# Patient Record
Sex: FEMALE | Race: WHITE | NOT HISPANIC OR LATINO | Employment: FULL TIME | URBAN - METROPOLITAN AREA
[De-identification: names, ages, dates, MRNs, and addresses within clinical notes are randomized per-mention and may not be internally consistent; named-entity substitution may affect disease eponyms.]

---

## 2019-09-05 ENCOUNTER — TELEPHONE (OUTPATIENT)
Dept: FAMILY MEDICINE | Facility: CLINIC | Age: 44
End: 2019-09-05

## 2019-09-10 ENCOUNTER — TELEPHONE (OUTPATIENT)
Dept: FAMILY MEDICINE | Facility: CLINIC | Age: 44
End: 2019-09-10

## 2019-09-10 DIAGNOSIS — D22.9 ATYPICAL MOLE: Primary | ICD-10-CM

## 2019-09-10 NOTE — TELEPHONE ENCOUNTER
Pt has moles, asking for you to ref her to a derm. Please put in ref for Ashely to assit with sched

## 2019-09-10 NOTE — TELEPHONE ENCOUNTER
----- Message from Matilde Ayala sent at 9/10/2019  3:57 PM CDT -----  Contact: [phone] self   Pt is requesting a referral for a Derm. Please advise

## 2019-10-02 ENCOUNTER — INITIAL CONSULT (OUTPATIENT)
Dept: DERMATOLOGY | Facility: CLINIC | Age: 44
End: 2019-10-02
Payer: COMMERCIAL

## 2019-10-02 VITALS — BODY MASS INDEX: 32.51 KG/M2 | HEIGHT: 72 IN | WEIGHT: 240 LBS

## 2019-10-02 DIAGNOSIS — L91.8 SKIN TAG: ICD-10-CM

## 2019-10-02 DIAGNOSIS — Z87.898 HISTORY OF ATYPICAL NEVUS: Primary | ICD-10-CM

## 2019-10-02 DIAGNOSIS — L30.4 INTERTRIGO: ICD-10-CM

## 2019-10-02 DIAGNOSIS — L50.9 URTICARIA: ICD-10-CM

## 2019-10-02 DIAGNOSIS — Z12.83 SKIN CANCER SCREENING: ICD-10-CM

## 2019-10-02 PROCEDURE — 3008F PR BODY MASS INDEX (BMI) DOCUMENTED: ICD-10-PCS | Mod: CPTII,S$GLB,, | Performed by: DERMATOLOGY

## 2019-10-02 PROCEDURE — 99203 OFFICE O/P NEW LOW 30 MIN: CPT | Mod: S$GLB,,, | Performed by: DERMATOLOGY

## 2019-10-02 PROCEDURE — 99999 PR PBB SHADOW E&M-EST. PATIENT-LVL II: CPT | Mod: PBBFAC,,, | Performed by: DERMATOLOGY

## 2019-10-02 PROCEDURE — 99999 PR PBB SHADOW E&M-EST. PATIENT-LVL II: ICD-10-PCS | Mod: PBBFAC,,, | Performed by: DERMATOLOGY

## 2019-10-02 PROCEDURE — 99203 PR OFFICE/OUTPT VISIT, NEW, LEVL III, 30-44 MIN: ICD-10-PCS | Mod: S$GLB,,, | Performed by: DERMATOLOGY

## 2019-10-02 PROCEDURE — 3008F BODY MASS INDEX DOCD: CPT | Mod: CPTII,S$GLB,, | Performed by: DERMATOLOGY

## 2019-10-02 NOTE — LETTER
October 2, 2019      Wai Rodriguez MD  [id number] [facility]  [address]  [facility] [address]           [address], [address]  [facility] [address]  Phone: [phone]          Patient: Matilde Redding   MR Number: [id number]   YOB: 1975   Date of Visit: 10/2/2019       Dear Dr. Wai Rodriguez:    Thank you for referring Matilde Redding to me for evaluation. Attached you will find relevant portions of my assessment and plan of care.    If you have questions, please do not hesitate to call me. I look forward to following Matilde Redding along with you.    Sincerely,    Rosalinda Elias MD    Enclosure  CC:  No Recipients    If you would like to receive this communication electronically, please contact [email or web address] or [phone] to request more information on [employer] Link access.    For providers and/or their staff who would like to refer a patient to [facility], please contact us through our one-stop-shop provider referral line, [facility], at [phone].    If you feel you have received this communication in error or would no longer like to receive these types of communications, please e-mail [email or web address]

## 2019-10-02 NOTE — PROGRESS NOTES
"  Subjective:       Patient ID:  Matilde Redding is a 44 y.o. female who presents for   Chief Complaint   Patient presents with    Spot     chest, bothersome, x several years, no tx    Skin Check     TBSE     New patient   Was previously seen by a dermatologist and had a mole removed and was told it was "pre-melanoma" at age 18. She has a couple concerning spots. She is concerned about a spot on her chest that has become bothersome and has been present for several years. She is not treating. She also desires a TBSE.     Former tanning bed exposure.   Denies family history atypical nevi or melanoma    C/o rash inframammary, years, worse with heat, using powder, irritating.   Hives when gots hot- unloading truck or at beach. No sob. Benadryl helps.     Review of Systems   Constitutional: Negative for fever, chills and fatigue.   Skin: Negative for daily sunscreen use, activity-related sunscreen use and wears hat.   Hematologic/Lymphatic: Does not bruise/bleed easily.        Objective:    Physical Exam   Constitutional: She appears well-developed and well-nourished. No distress.   HENT:   Mouth/Throat: Lips normal.    Eyes: Lids are normal.  No conjunctival no injection.   Cardiovascular: There is no local extremity swelling and no dependent edema.     Neurological: She is alert and oriented to person, place, and time. She is not disoriented.   Psychiatric: She has a normal mood and affect.   Skin:   Areas Examined (abnormalities noted in diagram):   Scalp / Hair Palpated and Inspected  Head / Face Inspection Performed  Neck Inspection Performed  Chest / Axilla Inspection Performed  Abdomen Inspection Performed  Genitals / Buttocks / Groin Inspection Performed  Back Inspection Performed  RUE Inspected  LUE Inspection Performed  RLE Inspected  LLE Inspection Performed  Nails and Digits Inspection Performed                   Diagram Legend     Erythematous scaling macule/papule c/w actinic keratosis       Vascular " papule c/w angioma      Pigmented verrucoid papule/plaque c/w seborrheic keratosis      Yellow umbilicated papule c/w sebaceous hyperplasia      Irregularly shaped tan macule c/w lentigo     1-2 mm smooth white papules consistent with Milia      Movable subcutaneous cyst with punctum c/w epidermal inclusion cyst      Subcutaneous movable cyst c/w pilar cyst      Firm pink to brown papule c/w dermatofibroma      Pedunculated fleshy papule(s) c/w skin tag(s)      Evenly pigmented macule c/w junctional nevus     Mildly variegated pigmented, slightly irregular-bordered macule c/w mildly atypical nevus      Flesh colored to evenly pigmented papule c/w intradermal nevus       Pink pearly papule/plaque c/w basal cell carcinoma      Erythematous hyperkeratotic cursted plaque c/w SCC      Surgical scar with no sign of skin cancer recurrence      Open and closed comedones      Inflammatory papules and pustules      Verrucoid papule consistent consistent with wart     Erythematous eczematous patches and plaques     Dystrophic onycholytic nail with subungual debris c/w onychomycosis     Umbilicated papule    Erythematous-base heme-crusted tan verrucoid plaque consistent with inflamed seborrheic keratosis     Erythematous Silvery Scaling Plaque c/w Psoriasis     See annotation      Assessment / Plan:        History of atypical nevus  Area of previous atypical nevus examined. Site well healed with no signs of recurrence.    Total body skin examination performed today including at least 12 points as noted in physical examination. No lesions suspicious for malignancy noted.      Skin cancer screening  Area of previous atypical nevus examined. Site well healed with no signs of recurrence.    Total body skin examination performed today including at least 12 points as noted in physical examination. No lesions suspicious for malignancy noted.    Intertrigo, inframammary  cool blow dry and then application of otc desitin cream bid    Once clear, use baby powder, corn starch, or Zeasorb AF powder for maintenance to affected area.      Urticaria  Heat induced  Refer to allergy  xyzal qam for now on days when knows will be overheated    Skin tag, left breast  Reassurance given to patient. No treatment is necessary.   Treatment of benign, asymptomatic lesions may be considered cosmetic.               Follow up in about 1 year (around 10/2/2020).

## 2019-11-05 ENCOUNTER — TELEPHONE (OUTPATIENT)
Dept: FAMILY MEDICINE | Facility: CLINIC | Age: 44
End: 2019-11-05

## 2019-11-05 DIAGNOSIS — Z00.00 ANNUAL PHYSICAL EXAM: Primary | ICD-10-CM

## 2019-11-05 NOTE — TELEPHONE ENCOUNTER
----- Message from Liliya Lugo sent at 11/5/2019 10:26 AM CST -----  Patient came in regarding needing labs put in before her appointment , would like to go to the [facility] in [place] , please call her# [phone] if have any questions. Thanks

## 2019-11-05 NOTE — TELEPHONE ENCOUNTER
Pt called and stated that she would like to have lab orders put in. She has an appt to see Dr. Rodriguez on 12/5. Former EJ pt. Please advise.

## 2019-11-21 ENCOUNTER — TELEPHONE (OUTPATIENT)
Dept: ADMINISTRATIVE | Facility: HOSPITAL | Age: 44
End: 2019-11-21

## 2019-11-21 ENCOUNTER — PATIENT OUTREACH (OUTPATIENT)
Dept: ADMINISTRATIVE | Facility: HOSPITAL | Age: 44
End: 2019-11-21

## 2019-11-21 LAB — HPV RNA, HR E6/E7, TMA: NEGATIVE

## 2019-11-25 ENCOUNTER — LAB VISIT (OUTPATIENT)
Dept: LAB | Facility: HOSPITAL | Age: 44
End: 2019-11-25
Attending: INTERNAL MEDICINE
Payer: COMMERCIAL

## 2019-11-25 DIAGNOSIS — Z00.00 ANNUAL PHYSICAL EXAM: ICD-10-CM

## 2019-11-25 LAB
ALBUMIN SERPL BCP-MCNC: 3.2 G/DL (ref 3.5–5.2)
ALP SERPL-CCNC: 95 U/L (ref 55–135)
ALT SERPL W/O P-5'-P-CCNC: 15 U/L (ref 10–44)
ANION GAP SERPL CALC-SCNC: 7 MMOL/L (ref 8–16)
AST SERPL-CCNC: 14 U/L (ref 10–40)
BASOPHILS # BLD AUTO: 0.04 K/UL (ref 0–0.2)
BASOPHILS NFR BLD: 0.5 % (ref 0–1.9)
BILIRUB SERPL-MCNC: 0.7 MG/DL (ref 0.1–1)
BUN SERPL-MCNC: 9 MG/DL (ref 6–20)
CALCIUM SERPL-MCNC: 8.7 MG/DL (ref 8.7–10.5)
CHLORIDE SERPL-SCNC: 106 MMOL/L (ref 95–110)
CHOLEST SERPL-MCNC: 215 MG/DL (ref 120–199)
CHOLEST/HDLC SERPL: 3.8 {RATIO} (ref 2–5)
CO2 SERPL-SCNC: 28 MMOL/L (ref 23–29)
CREAT SERPL-MCNC: 0.8 MG/DL (ref 0.5–1.4)
DIFFERENTIAL METHOD: ABNORMAL
EOSINOPHIL # BLD AUTO: 0.3 K/UL (ref 0–0.5)
EOSINOPHIL NFR BLD: 3.5 % (ref 0–8)
ERYTHROCYTE [DISTWIDTH] IN BLOOD BY AUTOMATED COUNT: 13.6 % (ref 11.5–14.5)
EST. GFR  (AFRICAN AMERICAN): >60 ML/MIN/1.73 M^2
EST. GFR  (NON AFRICAN AMERICAN): >60 ML/MIN/1.73 M^2
GLUCOSE SERPL-MCNC: 93 MG/DL (ref 70–110)
HCT VFR BLD AUTO: 45 % (ref 37–48.5)
HDLC SERPL-MCNC: 57 MG/DL (ref 40–75)
HDLC SERPL: 26.5 % (ref 20–50)
HGB BLD-MCNC: 14.2 G/DL (ref 12–16)
IMM GRANULOCYTES # BLD AUTO: 0.01 K/UL (ref 0–0.04)
IMM GRANULOCYTES NFR BLD AUTO: 0.1 % (ref 0–0.5)
LDLC SERPL CALC-MCNC: 144.4 MG/DL (ref 63–159)
LYMPHOCYTES # BLD AUTO: 2.8 K/UL (ref 1–4.8)
LYMPHOCYTES NFR BLD: 36.1 % (ref 18–48)
MCH RBC QN AUTO: 28.1 PG (ref 27–31)
MCHC RBC AUTO-ENTMCNC: 31.6 G/DL (ref 32–36)
MCV RBC AUTO: 89 FL (ref 82–98)
MONOCYTES # BLD AUTO: 0.4 K/UL (ref 0.3–1)
MONOCYTES NFR BLD: 5.7 % (ref 4–15)
NEUTROPHILS # BLD AUTO: 4.2 K/UL (ref 1.8–7.7)
NEUTROPHILS NFR BLD: 54.1 % (ref 38–73)
NONHDLC SERPL-MCNC: 158 MG/DL
NRBC BLD-RTO: 0 /100 WBC
PLATELET # BLD AUTO: 401 K/UL (ref 150–350)
PMV BLD AUTO: 9.8 FL (ref 9.2–12.9)
POTASSIUM SERPL-SCNC: 4.4 MMOL/L (ref 3.5–5.1)
PROT SERPL-MCNC: 7.2 G/DL (ref 6–8.4)
RBC # BLD AUTO: 5.05 M/UL (ref 4–5.4)
SODIUM SERPL-SCNC: 141 MMOL/L (ref 136–145)
TRIGL SERPL-MCNC: 68 MG/DL (ref 30–150)
TSH SERPL DL<=0.005 MIU/L-ACNC: 1.04 UIU/ML (ref 0.4–4)
WBC # BLD AUTO: 7.75 K/UL (ref 3.9–12.7)

## 2019-11-25 PROCEDURE — 80061 LIPID PANEL: CPT

## 2019-11-25 PROCEDURE — 36415 COLL VENOUS BLD VENIPUNCTURE: CPT | Mod: PO

## 2019-11-25 PROCEDURE — 85025 COMPLETE CBC W/AUTO DIFF WBC: CPT

## 2019-11-25 PROCEDURE — 84443 ASSAY THYROID STIM HORMONE: CPT

## 2019-11-25 PROCEDURE — 80053 COMPREHEN METABOLIC PANEL: CPT

## 2019-11-26 ENCOUNTER — TELEPHONE (OUTPATIENT)
Dept: FAMILY MEDICINE | Facility: CLINIC | Age: 44
End: 2019-11-26

## 2019-11-26 NOTE — TELEPHONE ENCOUNTER
----- Message from Hanna Foy sent at 11/26/2019 11:27 AM CST -----  Contact: [phone]/self   Patient states she is returning your call. Please advise.

## 2019-12-05 ENCOUNTER — OFFICE VISIT (OUTPATIENT)
Dept: FAMILY MEDICINE | Facility: CLINIC | Age: 44
End: 2019-12-05
Payer: COMMERCIAL

## 2019-12-05 VITALS
WEIGHT: 258.69 LBS | TEMPERATURE: 98 F | HEART RATE: 72 BPM | DIASTOLIC BLOOD PRESSURE: 86 MMHG | HEIGHT: 72 IN | BODY MASS INDEX: 35.04 KG/M2 | SYSTOLIC BLOOD PRESSURE: 128 MMHG | OXYGEN SATURATION: 98 %

## 2019-12-05 DIAGNOSIS — Z00.00 ANNUAL PHYSICAL EXAM: ICD-10-CM

## 2019-12-05 DIAGNOSIS — Z23 NEED FOR INFLUENZA VACCINATION: Primary | ICD-10-CM

## 2019-12-05 PROCEDURE — 90686 FLU VACCINE (QUAD) GREATER THAN OR EQUAL TO 3YO PRESERVATIVE FREE IM: ICD-10-PCS | Mod: S$GLB,,, | Performed by: INTERNAL MEDICINE

## 2019-12-05 PROCEDURE — 90471 IMMUNIZATION ADMIN: CPT | Mod: S$GLB,,, | Performed by: INTERNAL MEDICINE

## 2019-12-05 PROCEDURE — 99999 PR PBB SHADOW E&M-EST. PATIENT-LVL III: ICD-10-PCS | Mod: PBBFAC,,, | Performed by: INTERNAL MEDICINE

## 2019-12-05 PROCEDURE — 90686 IIV4 VACC NO PRSV 0.5 ML IM: CPT | Mod: S$GLB,,, | Performed by: INTERNAL MEDICINE

## 2019-12-05 PROCEDURE — 99396 PREV VISIT EST AGE 40-64: CPT | Mod: 25,S$GLB,, | Performed by: INTERNAL MEDICINE

## 2019-12-05 PROCEDURE — 99396 PR PREVENTIVE VISIT,EST,40-64: ICD-10-PCS | Mod: 25,S$GLB,, | Performed by: INTERNAL MEDICINE

## 2019-12-05 PROCEDURE — 99999 PR PBB SHADOW E&M-EST. PATIENT-LVL III: CPT | Mod: PBBFAC,,, | Performed by: INTERNAL MEDICINE

## 2019-12-05 PROCEDURE — 90471 FLU VACCINE (QUAD) GREATER THAN OR EQUAL TO 3YO PRESERVATIVE FREE IM: ICD-10-PCS | Mod: S$GLB,,, | Performed by: INTERNAL MEDICINE

## 2019-12-05 RX ORDER — CETIRIZINE HYDROCHLORIDE 10 MG/1
10 TABLET ORAL DAILY
COMMUNITY
End: 2023-11-13 | Stop reason: ALTCHOICE

## 2019-12-05 RX ORDER — OSELTAMIVIR PHOSPHATE 75 MG/1
75 CAPSULE ORAL DAILY
Qty: 10 CAPSULE | Refills: 0 | Status: SHIPPED | OUTPATIENT
Start: 2019-12-05 | End: 2019-12-10

## 2019-12-05 NOTE — PROGRESS NOTES
[facility] Note    Chief Complaint      Chief Complaint   Patient presents with    Annual Exam       History of Present Illness      Matilde Redding is a 44 y.o. female who presents today for   Chief Complaint   Patient presents with    Annual Exam   .  Patient comes to appointment here for annual preventative visit with me . She just had  full labs done all reviewed by me . She is grieving the loss of her father but is feeling and grieving appropriately . Her  was diagnosed with the flu today and she has not had flu vaccine . She has no symptoms currently . She is not as active with exercise and needs to be better donnie diet .       Problem List Items Addressed This Visit        ID    Need for influenza vaccination - Primary       Other    Annual physical exam    Overview     pe documented all labs reviewed with patient today   Stressed diet and exercise   tamiflu prophylaxis flu vaccine given                 Past Medical History:  History reviewed. No pertinent past medical history.    Past Surgical History:  Past Surgical History:   Procedure Laterality Date    PARTIAL HYSTERECTOMY  2017       Family History:  family history includes Cancer in her father; Heart disease in her father; Hypertension in her father and mother.    Social History:  Social History     Socioeconomic History    Marital status:      Spouse name: Not on file    Number of children: Not on file    Years of education: Not on file    Highest education level: Not on file   Occupational History    Not on file   Social Needs    Financial resource strain: Not very hard    Food insecurity:     Worry: Never true     Inability: Never true    Transportation needs:     Medical: No     Non-medical: No   Tobacco Use    Smoking status: Never Smoker    Smokeless tobacco: Never Used   Substance and Sexual Activity    Alcohol use: Not on file    Drug use: Not on file    Sexual activity: Not on file    Lifestyle    Physical activity:     Days per week: 2 days     Minutes per session: 0 min    Stress: To some extent   Relationships    Social connections:     Talks on phone: Three times a week     Gets together: Once a week     Attends [employer] service: Not on file     Active member of club or organization: No     Attends meetings of clubs or organizations: Never     Relationship status:    Other Topics Concern    Are you pregnant or think you may be? Not Asked    Breast-feeding Not Asked   Social History Narrative    Not on file       Review of Systems:   Review of Systems   Constitutional: Negative for chills and fever.   HENT: Negative for hearing loss.    Eyes: Negative for blurred vision and discharge.   Respiratory: Negative for wheezing.    Cardiovascular: Negative for chest pain and palpitations.   Gastrointestinal: Negative for blood in stool, constipation, diarrhea and vomiting.   Genitourinary: Negative for dysuria and hematuria.   Musculoskeletal: Negative for neck pain.   Neurological: Negative for weakness and headaches.   Endo/Heme/Allergies: Negative for polydipsia.   Psychiatric/Behavioral: Negative for depression. The patient is not nervous/anxious and does not have insomnia.          Medications:  Outpatient Encounter Medications as of 12/5/2019   Medication Sig Dispense Refill    cetirizine (ZYRTEC) 10 MG tablet Take 10 mg by mouth once daily.      norethin-e.estradiol triphasic (TRI-NORINYL, 28,) 0.5/1/0.5-35 mg-mcg Tab Take 1 tablet by mouth.      oseltamivir (TAMIFLU) 75 MG capsule Take 1 capsule (75 mg total) by mouth once daily. for 5 days 10 capsule 0     No facility-administered encounter medications on file as of 12/5/2019.         Allergies:  Review of patient's allergies indicates:  No Known Allergies      Physical Exam      Vitals:    12/05/19 0824   BP: 128/86   Pulse: 72   Temp: 97.9 °F (36.6 °C)      Body mass index is 35.09 kg/m².    Physical Exam    Constitutional: She is oriented to person, place, and time. She appears well-developed and well-nourished.   HENT:   Mouth/Throat: Oropharynx is clear and moist.   Eyes: Pupils are equal, round, and reactive to light. EOM are normal.   Neck: Normal range of motion. No thyromegaly present.   Cardiovascular: Normal rate and normal heart sounds. Exam reveals no gallop and no friction rub.   No murmur heard.  Pulmonary/Chest: Breath sounds normal.   Abdominal: Soft. Bowel sounds are normal.   Musculoskeletal: Normal range of motion.   Lymphadenopathy:     She has no cervical adenopathy.   Neurological: She is alert and oriented to person, place, and time. No cranial nerve deficit.   Skin: Skin is warm. No rash noted.   Psychiatric: She has a normal mood and affect. Her behavior is normal.        Laboratory:  CBC:  Recent Labs   Lab Result Units 11/25/19  0846   WBC K/uL 7.75   RBC M/uL 5.05   Hemoglobin g/dL 14.2   Hematocrit % 45.0   Platelets K/uL 401*   Mean Corpuscular Volume fL 89   Mean Corpuscular Hemoglobin pg 28.1   Mean Corpuscular Hemoglobin Conc g/dL 31.6*     CMP:  Recent Labs   Lab Result Units 11/25/19  0846   Glucose mg/dL 93   Calcium mg/dL 8.7   Albumin g/dL 3.2*   Total Protein g/dL 7.2   Sodium mmol/L 141   Potassium mmol/L 4.4   CO2 mmol/L 28   Chloride mmol/L 106   BUN, Bld mg/dL 9   Alkaline Phosphatase U/L 95   ALT U/L 15   AST U/L 14   Total Bilirubin mg/dL 0.7     URINALYSIS:  No results for input(s): COLORU, CLARITYU, SPECGRAV, PHUR, PROTEINUA, GLUCOSEU, BILIRUBINCON, BLOODU, WBCU, RBCU, BACTERIA, MUCUS, NITRITE, LEUKOCYTESUR, UROBILINOGEN, HYALINECASTS in the last 2160 hours.   LIPIDS:  Recent Labs   Lab Result Units 11/25/19  0846   TSH uIU/mL 1.042   HDL mg/dL 57   Cholesterol mg/dL 215*   Triglycerides mg/dL 68   LDL Cholesterol mg/dL 144.4   Hdl/Cholesterol Ratio % 26.5   Non-HDL Cholesterol mg/dL 158   Total Cholesterol/HDL Ratio  3.8     TSH:  Recent Labs   Lab Result Units  11/25/19  0846   TSH uIU/mL 1.042     A1C:  No results for input(s): HGBA1C in the last 2160 hours.    Radiology:        Assessment:     Matilde Redding is a 44 y.o.female with:    Need for influenza vaccination  -     Influenza - Quadrivalent (6 months+) (PF)  -     oseltamivir (TAMIFLU) 75 MG capsule; Take 1 capsule (75 mg total) by mouth once daily. for 5 days  Dispense: 10 capsule; Refill: 0    Annual physical exam          Plan:     Problem List Items Addressed This Visit        ID    Need for influenza vaccination - Primary       Other    Annual physical exam    Overview     pe documented all labs reviewed with patient today   Stressed diet and exercise   tamiflu prophylaxis flu vaccine given               As above, continue current medications and maintain follow up with specialists.  Return to clinic in 12 months.    Frederick W Dantagnan [facility] [place]                Answers for HPI/ROS submitted by the patient on 11/28/2019   activity change: No  unexpected weight change: No  rhinorrhea: No  trouble swallowing: No  visual disturbance: No  chest tightness: No  polyuria: No  difficulty urinating: No  menstrual problem: No  joint swelling: No  arthralgias: No  confusion: No  dysphoric mood: No

## 2020-03-09 PROBLEM — Z00.00 ANNUAL PHYSICAL EXAM: Status: RESOLVED | Noted: 2019-12-05 | Resolved: 2020-03-09

## 2020-05-08 DIAGNOSIS — Z12.39 BREAST CANCER SCREENING: ICD-10-CM

## 2020-06-10 ENCOUNTER — TELEPHONE (OUTPATIENT)
Dept: FAMILY MEDICINE | Facility: CLINIC | Age: 45
End: 2020-06-10

## 2020-06-10 DIAGNOSIS — I10 BENIGN ESSENTIAL HYPERTENSION: Primary | ICD-10-CM

## 2020-06-10 RX ORDER — LISINOPRIL 10 MG/1
10 TABLET ORAL DAILY
Qty: 30 TABLET | Refills: 5 | Status: SHIPPED | OUTPATIENT
Start: 2020-06-10 | End: 2021-03-18

## 2020-06-10 NOTE — TELEPHONE ENCOUNTER
Spoke with patient. Informed patient of Dr Rodriguez's recommendation to initiate Lisinopril and to follow up with him in 2 weeks. Pt verbalizes understanding, and follow up appointment given to supervisor to schedule.

## 2020-06-10 NOTE — TELEPHONE ENCOUNTER
rec we start blood pressure meds lisinopril 10 mg po daily disp 30 refill5 schedule her for bp f/u in 2 week s

## 2020-06-10 NOTE — TELEPHONE ENCOUNTER
----- Message from Ca Albarran sent at 6/10/2020 12:07 PM CDT -----  Contact: self  Type:  Needs Medical Advice    Who Called: self  Reason:Blood pressure is kind of high and was told to get advice from you  Would the patient rather a call back or a response via [facility]? callback  Best Call Back Number: [phone]  Additional Information: none

## 2020-06-10 NOTE — TELEPHONE ENCOUNTER
Spoke with patient. Pt calling in concerned about some elevated BP readings. Pt states that she had to medical visits today and at both visits her BP was elevated, 151/91 and 130/90. Pt states that during her GYN visit, the Dr suggested that she get a recommendation from Dr Rodriguez concerning the BP and the estrogen in her birth control pills. Pt would like to know if Dr Rodriguez recommends keeping the pill with the estrogen or having them changed due to BP elevation.  Informed patient that I would forward this message to Dr Rodriguez and once he responds someone would give her a call back. Pt verbalizes understanding.

## 2020-06-22 ENCOUNTER — OFFICE VISIT (OUTPATIENT)
Dept: FAMILY MEDICINE | Facility: CLINIC | Age: 45
End: 2020-06-22
Payer: COMMERCIAL

## 2020-06-22 VITALS
BODY MASS INDEX: 34.18 KG/M2 | WEIGHT: 252 LBS | SYSTOLIC BLOOD PRESSURE: 134 MMHG | DIASTOLIC BLOOD PRESSURE: 86 MMHG | TEMPERATURE: 97 F | HEART RATE: 88 BPM | OXYGEN SATURATION: 96 %

## 2020-06-22 DIAGNOSIS — I10 BENIGN ESSENTIAL HYPERTENSION: Primary | ICD-10-CM

## 2020-06-22 PROCEDURE — 3008F BODY MASS INDEX DOCD: CPT | Mod: CPTII,S$GLB,, | Performed by: INTERNAL MEDICINE

## 2020-06-22 PROCEDURE — 3079F PR MOST RECENT DIASTOLIC BLOOD PRESSURE 80-89 MM HG: ICD-10-PCS | Mod: CPTII,S$GLB,, | Performed by: INTERNAL MEDICINE

## 2020-06-22 PROCEDURE — 3075F SYST BP GE 130 - 139MM HG: CPT | Mod: CPTII,S$GLB,, | Performed by: INTERNAL MEDICINE

## 2020-06-22 PROCEDURE — 99213 PR OFFICE/OUTPT VISIT, EST, LEVL III, 20-29 MIN: ICD-10-PCS | Mod: S$GLB,,, | Performed by: INTERNAL MEDICINE

## 2020-06-22 PROCEDURE — 99999 PR PBB SHADOW E&M-EST. PATIENT-LVL III: CPT | Mod: PBBFAC,,, | Performed by: INTERNAL MEDICINE

## 2020-06-22 PROCEDURE — 3075F PR MOST RECENT SYSTOLIC BLOOD PRESS GE 130-139MM HG: ICD-10-PCS | Mod: CPTII,S$GLB,, | Performed by: INTERNAL MEDICINE

## 2020-06-22 PROCEDURE — 3008F PR BODY MASS INDEX (BMI) DOCUMENTED: ICD-10-PCS | Mod: CPTII,S$GLB,, | Performed by: INTERNAL MEDICINE

## 2020-06-22 PROCEDURE — 3079F DIAST BP 80-89 MM HG: CPT | Mod: CPTII,S$GLB,, | Performed by: INTERNAL MEDICINE

## 2020-06-22 PROCEDURE — 99999 PR PBB SHADOW E&M-EST. PATIENT-LVL III: ICD-10-PCS | Mod: PBBFAC,,, | Performed by: INTERNAL MEDICINE

## 2020-06-22 PROCEDURE — 99213 OFFICE O/P EST LOW 20 MIN: CPT | Mod: S$GLB,,, | Performed by: INTERNAL MEDICINE

## 2020-06-22 NOTE — PROGRESS NOTES
[facility] Note    Chief Complaint      Chief Complaint   Patient presents with    Follow-up     f/u on HTN after starting Lisinopril        History of Present Illness      Matilde Redding is a 45 y.o. female who presents today for   Chief Complaint   Patient presents with    Follow-up     f/u on HTN after starting Lisinopril    .  Patient comes to appointment here for f/u for htn . She was recently started on lisinopril on the 10th of June after gyn visit noted to be elevated . She needs to get her ocp renewed but needed bp controlled first . She just had wisdom teeth removed 6/17/2020 . She is in pain currently but her bp si fairly well controlled at 130/84 after repeat by me .    Problem List Items Addressed This Visit        Cardiac/Vascular    Benign essential hypertension - Primary    Overview     bp better controlled likely still sightly elevated secondary to her mouth pain . Ok to resume ocp                  Past Medical History:  History reviewed. No pertinent past medical history.    Past Surgical History:  Past Surgical History:   Procedure Laterality Date    HYSTERECTOMY  6/2017    Ovary removed and fallopian tubes removed    PARTIAL HYSTERECTOMY  2017    WISDOM TOOTH EXTRACTION         Family History:  family history includes Cancer in her father; Heart disease in her father; Hypertension in her father and mother.    Social History:  Social History     Socioeconomic History    Marital status:      Spouse name: Not on file    Number of children: Not on file    Years of education: Not on file    Highest education level: Not on file   Occupational History    Not on file   Social Needs    Financial resource strain: Not very hard    Food insecurity     Worry: Never true     Inability: Never true    Transportation needs     Medical: No     Non-medical: No   Tobacco Use    Smoking status: Never Smoker    Smokeless tobacco: Never Used   Substance and Sexual  Activity    Alcohol use: Yes     Alcohol/week: 2.0 standard drinks     Types: 1 Glasses of wine, 1 Cans of beer per week     Frequency: 2-4 times a month     Drinks per session: 1 or 2     Binge frequency: Never    Drug use: Never    Sexual activity: Not Currently     Partners: Male     Birth control/protection: Other-see comments     Comment: Birth control pill   Lifestyle    Physical activity     Days per week: 2 days     Minutes per session: 0 min    Stress: To some extent   Relationships    Social connections     Talks on phone: Three times a week     Gets together: Once a week     Attends [employer] service: Not on file     Active member of club or organization: No     Attends meetings of clubs or organizations: Never     Relationship status:    Other Topics Concern    Are you pregnant or think you may be? Not Asked    Breast-feeding Not Asked   Social History Narrative    Not on file       Review of Systems:   Review of Systems   Constitutional: Negative for malaise/fatigue.   Eyes: Negative for blurred vision.   Respiratory: Negative for shortness of breath.    Cardiovascular: Negative for chest pain, palpitations, orthopnea and PND.   Gastrointestinal: Negative for abdominal pain, constipation, diarrhea and heartburn.   Musculoskeletal: Negative for neck pain.   Neurological: Negative for headaches.   Psychiatric/Behavioral: Negative for depression. The patient is not nervous/anxious and does not have insomnia.          Medications:  Outpatient Encounter Medications as of 6/22/2020   Medication Sig Dispense Refill    cetirizine (ZYRTEC) 10 MG tablet Take 10 mg by mouth once daily.      lisinopriL 10 MG tablet Take 1 tablet (10 mg total) by mouth once daily. 30 tablet 5    norethin-e.estradiol triphasic (TRI-NORINYL, 28,) 0.5/1/0.5-35 mg-mcg Tab Take 1 tablet by mouth.       No facility-administered encounter medications on file as of 6/22/2020.         Allergies:  Review of patient's  allergies indicates:  No Known Allergies      Physical Exam      Vitals:    06/22/20 1046   BP: 134/86   Pulse: 88   Temp: 97.3 °F (36.3 °C)      Body mass index is 34.18 kg/m².    Physical Exam  Constitutional:       Appearance: She is well-developed.   Eyes:      Pupils: Pupils are equal, round, and reactive to light.   Neck:      Musculoskeletal: Normal range of motion.      Thyroid: No thyromegaly.   Cardiovascular:      Rate and Rhythm: Normal rate.      Heart sounds: Normal heart sounds. No murmur. No friction rub. No gallop.    Pulmonary:      Breath sounds: Normal breath sounds.   Abdominal:      General: Bowel sounds are normal.      Palpations: Abdomen is soft.   Musculoskeletal: Normal range of motion.   Lymphadenopathy:      Cervical: No cervical adenopathy.   Skin:     General: Skin is warm.      Findings: No rash.   Neurological:      Mental Status: She is alert and oriented to person, place, and time.      Cranial Nerves: No cranial nerve deficit.   Psychiatric:         Behavior: Behavior normal.          Laboratory:  CBC:  No results for input(s): WBC, RBC, HGB, HCT, PLT, MCV, MCH, MCHC in the last 2160 hours.  CMP:  No results for input(s): GLU, CALCIUM, ALBUMIN, PROT, NA, K, CO2, CL, BUN, ALKPHOS, ALT, AST, BILITOT in the last 2160 hours.    Invalid input(s): CREATININ  URINALYSIS:  No results for input(s): COLORU, CLARITYU, SPECGRAV, PHUR, PROTEINUA, GLUCOSEU, BILIRUBINCON, BLOODU, WBCU, RBCU, BACTERIA, MUCUS, NITRITE, LEUKOCYTESUR, UROBILINOGEN, HYALINECASTS in the last 2160 hours.   LIPIDS:  No results for input(s): TSH, HDL, CHOL, TRIG, LDLCALC, CHOLHDL, NONHDLCHOL, TOTALCHOLEST in the last 2160 hours.  TSH:  No results for input(s): TSH in the last 2160 hours.  A1C:  No results for input(s): HGBA1C in the last 2160 hours.    Radiology:        Assessment:     Matilde Redding is a 45 y.o.female with:    Benign essential hypertension          Plan:     Problem List Items Addressed This Visit         Cardiac/Vascular    Benign essential hypertension - Primary    Overview     bp better controlled likely still sightly elevated secondary to her mouth pain . Ok to resume ocp                As above, continue current medications and maintain follow up with specialists.  Return to clinic in 6 months.      [facility]erick W Dantagnan [facility] [place]                  Answers for HPI/ROS submitted by the patient on 6/20/2020   Hypertension  Chronicity: new  Progression since onset: unchanged  Condition status: controlled  anxiety: No  peripheral edema: No  sweats: No  Agents associated with hypertension: NSAIDs, oral contraceptives  CAD risks: family history, obesity  Past treatments: nothing

## 2020-10-05 ENCOUNTER — PATIENT MESSAGE (OUTPATIENT)
Dept: INTERNAL MEDICINE | Facility: CLINIC | Age: 45
End: 2020-10-05

## 2020-10-12 ENCOUNTER — TELEPHONE (OUTPATIENT)
Dept: ADMINISTRATIVE | Facility: HOSPITAL | Age: 45
End: 2020-10-12

## 2020-12-16 ENCOUNTER — TELEPHONE (OUTPATIENT)
Dept: FAMILY MEDICINE | Facility: CLINIC | Age: 45
End: 2020-12-16

## 2020-12-16 DIAGNOSIS — Z11.4 ENCOUNTER FOR SCREENING FOR HIV: ICD-10-CM

## 2020-12-16 DIAGNOSIS — Z00.00 ANNUAL PHYSICAL EXAM: Primary | ICD-10-CM

## 2020-12-16 DIAGNOSIS — Z11.59 ENCOUNTER FOR HEPATITIS C SCREENING TEST FOR LOW RISK PATIENT: ICD-10-CM

## 2020-12-17 ENCOUNTER — TELEPHONE (OUTPATIENT)
Dept: FAMILY MEDICINE | Facility: CLINIC | Age: 45
End: 2020-12-17

## 2020-12-17 NOTE — TELEPHONE ENCOUNTER
----- Message from Conchis Mendoza sent at 12/17/2020  4:27 PM CST -----  Type:  Patient R Call    Who Called: May   Would the patient rather a call back or a response via [facility]? Call back   Best Call Back Number: [phone]   Additional Information: wants blood work done tomorrow at [facility] in [place]

## 2020-12-19 LAB
ALBUMIN SERPL-MCNC: 3.4 G/DL (ref 3.6–5.1)
ALBUMIN/GLOB SERPL: 1 (CALC) (ref 1–2.5)
ALP SERPL-CCNC: 85 U/L (ref 31–125)
ALT SERPL-CCNC: 11 U/L (ref 6–29)
AST SERPL-CCNC: 11 U/L (ref 10–35)
BASOPHILS # BLD AUTO: 60 CELLS/UL (ref 0–200)
BASOPHILS NFR BLD AUTO: 0.8 %
BILIRUB SERPL-MCNC: 0.4 MG/DL (ref 0.2–1.2)
BUN SERPL-MCNC: 11 MG/DL (ref 7–25)
BUN/CREAT SERPL: ABNORMAL (CALC) (ref 6–22)
CALCIUM SERPL-MCNC: 8.9 MG/DL (ref 8.6–10.2)
CHLORIDE SERPL-SCNC: 107 MMOL/L (ref 98–110)
CHOLEST SERPL-MCNC: 204 MG/DL
CHOLEST/HDLC SERPL: 3.6 (CALC)
CO2 SERPL-SCNC: 27 MMOL/L (ref 20–32)
CREAT SERPL-MCNC: 0.76 MG/DL (ref 0.5–1.1)
EOSINOPHIL # BLD AUTO: 270 CELLS/UL (ref 15–500)
EOSINOPHIL NFR BLD AUTO: 3.6 %
ERYTHROCYTE [DISTWIDTH] IN BLOOD BY AUTOMATED COUNT: 13.3 % (ref 11–15)
GFRSERPLBLD MDRD-ARVRAT: 95 ML/MIN/1.73M2
GLOBULIN SER CALC-MCNC: 3.4 G/DL (CALC) (ref 1.9–3.7)
GLUCOSE SERPL-MCNC: 90 MG/DL (ref 65–99)
HCT VFR BLD AUTO: 43.5 % (ref 35–45)
HCV AB S/CO SERPL IA: 0.01
HCV AB SERPL QL IA: NORMAL
HDLC SERPL-MCNC: 57 MG/DL
HGB BLD-MCNC: 13.9 G/DL (ref 11.7–15.5)
LDLC SERPL CALC-MCNC: 127 MG/DL (CALC)
LYMPHOCYTES # BLD AUTO: 3083 CELLS/UL (ref 850–3900)
LYMPHOCYTES NFR BLD AUTO: 41.1 %
MCH RBC QN AUTO: 28.1 PG (ref 27–33)
MCHC RBC AUTO-ENTMCNC: 32 G/DL (ref 32–36)
MCV RBC AUTO: 87.9 FL (ref 80–100)
MONOCYTES # BLD AUTO: 458 CELLS/UL (ref 200–950)
MONOCYTES NFR BLD AUTO: 6.1 %
NEUTROPHILS # BLD AUTO: 3630 CELLS/UL (ref 1500–7800)
NEUTROPHILS NFR BLD AUTO: 48.4 %
NONHDLC SERPL-MCNC: 147 MG/DL (CALC)
PLATELET # BLD AUTO: 379 THOUSAND/UL (ref 140–400)
PMV BLD REES-ECKER: 9.7 FL (ref 7.5–12.5)
POTASSIUM SERPL-SCNC: 4.7 MMOL/L (ref 3.5–5.3)
PROT SERPL-MCNC: 6.8 G/DL (ref 6.1–8.1)
RBC # BLD AUTO: 4.95 MILLION/UL (ref 3.8–5.1)
SODIUM SERPL-SCNC: 142 MMOL/L (ref 135–146)
TRIGL SERPL-MCNC: 95 MG/DL
WBC # BLD AUTO: 7.5 THOUSAND/UL (ref 3.8–10.8)

## 2020-12-21 ENCOUNTER — OFFICE VISIT (OUTPATIENT)
Dept: FAMILY MEDICINE | Facility: CLINIC | Age: 45
End: 2020-12-21
Payer: COMMERCIAL

## 2020-12-21 VITALS
RESPIRATION RATE: 18 BRPM | HEART RATE: 70 BPM | WEIGHT: 261.13 LBS | BODY MASS INDEX: 35.37 KG/M2 | OXYGEN SATURATION: 99 % | DIASTOLIC BLOOD PRESSURE: 90 MMHG | HEIGHT: 72 IN | SYSTOLIC BLOOD PRESSURE: 128 MMHG | TEMPERATURE: 98 F

## 2020-12-21 DIAGNOSIS — G47.00 INSOMNIA, UNSPECIFIED TYPE: ICD-10-CM

## 2020-12-21 DIAGNOSIS — Z00.00 ANNUAL PHYSICAL EXAM: Primary | ICD-10-CM

## 2020-12-21 PROCEDURE — 3008F PR BODY MASS INDEX (BMI) DOCUMENTED: ICD-10-PCS | Mod: CPTII,S$GLB,, | Performed by: INTERNAL MEDICINE

## 2020-12-21 PROCEDURE — 99396 PR PREVENTIVE VISIT,EST,40-64: ICD-10-PCS | Mod: S$GLB,,, | Performed by: INTERNAL MEDICINE

## 2020-12-21 PROCEDURE — 3074F PR MOST RECENT SYSTOLIC BLOOD PRESSURE < 130 MM HG: ICD-10-PCS | Mod: CPTII,S$GLB,, | Performed by: INTERNAL MEDICINE

## 2020-12-21 PROCEDURE — 99396 PREV VISIT EST AGE 40-64: CPT | Mod: S$GLB,,, | Performed by: INTERNAL MEDICINE

## 2020-12-21 PROCEDURE — 3074F SYST BP LT 130 MM HG: CPT | Mod: CPTII,S$GLB,, | Performed by: INTERNAL MEDICINE

## 2020-12-21 PROCEDURE — 99999 PR PBB SHADOW E&M-EST. PATIENT-LVL III: ICD-10-PCS | Mod: PBBFAC,,, | Performed by: INTERNAL MEDICINE

## 2020-12-21 PROCEDURE — 3080F PR MOST RECENT DIASTOLIC BLOOD PRESSURE >= 90 MM HG: ICD-10-PCS | Mod: CPTII,S$GLB,, | Performed by: INTERNAL MEDICINE

## 2020-12-21 PROCEDURE — 99999 PR PBB SHADOW E&M-EST. PATIENT-LVL III: CPT | Mod: PBBFAC,,, | Performed by: INTERNAL MEDICINE

## 2020-12-21 PROCEDURE — 1126F AMNT PAIN NOTED NONE PRSNT: CPT | Mod: S$GLB,,, | Performed by: INTERNAL MEDICINE

## 2020-12-21 PROCEDURE — 3008F BODY MASS INDEX DOCD: CPT | Mod: CPTII,S$GLB,, | Performed by: INTERNAL MEDICINE

## 2020-12-21 PROCEDURE — 1126F PR PAIN SEVERITY QUANTIFIED, NO PAIN PRESENT: ICD-10-PCS | Mod: S$GLB,,, | Performed by: INTERNAL MEDICINE

## 2020-12-21 PROCEDURE — 3080F DIAST BP >= 90 MM HG: CPT | Mod: CPTII,S$GLB,, | Performed by: INTERNAL MEDICINE

## 2020-12-21 RX ORDER — TRAZODONE HYDROCHLORIDE 100 MG/1
100 TABLET ORAL NIGHTLY
Qty: 30 TABLET | Refills: 1 | Status: SHIPPED | OUTPATIENT
Start: 2020-12-21 | End: 2022-04-14

## 2020-12-21 NOTE — PROGRESS NOTES
[facility] Note    Chief Complaint      Chief Complaint   Patient presents with    Annual Exam       History of Present Illness      Matilde Redding is a 45 y.o. female who presents today for   Chief Complaint   Patient presents with    Annual Exam   .  Patient comes to appointment for annual preventative . All labs done and reviewed with patient today . She is feeling well . She admits to sometimes forgetting her blood pressure medication. She is not exercising but she is very active with her job.     Problem List Items Addressed This Visit        Other    Annual physical exam - Primary    Overview     pe documented all labs reviewed with patient today   Stressed diet and exercise              Other Visit Diagnoses     Insomnia, unspecified type                Past Medical History:  History reviewed. No pertinent past medical history.    Past Surgical History:  Past Surgical History:   Procedure Laterality Date    HYSTERECTOMY  6/2017    Ovary removed and fallopian tubes removed    PARTIAL HYSTERECTOMY  2017    WISDOM TOOTH EXTRACTION         Family History:  family history includes Cancer in her father; Heart disease in her father; Hypertension in her father and mother.    Social History:  Social History     Socioeconomic History    Marital status:      Spouse name: Not on file    Number of children: Not on file    Years of education: Not on file    Highest education level: Not on file   Occupational History    Not on file   Social Needs    Financial resource strain: Not hard at all    Food insecurity     Worry: Never true     Inability: Never true    Transportation needs     Medical: No     Non-medical: No   Tobacco Use    Smoking status: Never Smoker    Smokeless tobacco: Never Used   Substance and Sexual Activity    Alcohol use: Yes     Alcohol/week: 2.0 standard drinks     Types: 1 Glasses of wine, 1 Cans of beer per week     Frequency: Monthly or less      Drinks per session: 1 or 2     Binge frequency: Never    Drug use: Never    Sexual activity: Not Currently     Partners: Male     Birth control/protection: Other-see comments     Comment: Birth control pill   Lifestyle    Physical activity     Days per week: 0 days     Minutes per session: 0 min    Stress: To some extent   Relationships    Social connections     Talks on phone: Once a week     Gets together: Never     Attends Yazidism service: Not on file     Active member of club or organization: No     Attends meetings of clubs or organizations: Never     Relationship status:    Other Topics Concern    Are you pregnant or think you may be? Not Asked    Breast-feeding Not Asked   Social History Narrative    Not on file       Review of Systems:   Review of Systems   HENT: Negative for hearing loss.    Eyes: Negative for discharge.   Respiratory: Negative for wheezing.    Cardiovascular: Negative for chest pain and palpitations.   Gastrointestinal: Negative for blood in stool, constipation, diarrhea and vomiting.   Genitourinary: Negative for dysuria and hematuria.   Musculoskeletal: Negative for neck pain.   Neurological: Negative for weakness and headaches.   Endo/Heme/Allergies: Negative for polydipsia.         Medications:  Outpatient Encounter Medications as of 12/21/2020   Medication Sig Dispense Refill    cetirizine (ZYRTEC) 10 MG tablet Take 10 mg by mouth once daily.      lisinopriL 10 MG tablet Take 1 tablet (10 mg total) by mouth once daily. 30 tablet 5    norethin-e.estradiol triphasic (TRI-NORINYL, 28,) 0.5/1/0.5-35 mg-mcg Tab Take 1 tablet by mouth.      traZODone (DESYREL) 100 MG tablet Take 1 tablet (100 mg total) by mouth every evening. 30 tablet 1     No facility-administered encounter medications on file as of 12/21/2020.         Allergies:  Review of patient's allergies indicates:  No Known Allergies      Physical Exam      Vitals:    12/21/20 0818   BP: (!) 128/90   Pulse: 70    Resp: 18   Temp: 97.5 °F (36.4 °C)      Body mass index is 35.42 kg/m².    Physical Exam  Constitutional:       Appearance: She is well-developed.   Eyes:      Pupils: Pupils are equal, round, and reactive to light.   Neck:      Musculoskeletal: Normal range of motion.      Thyroid: No thyromegaly.   Cardiovascular:      Rate and Rhythm: Normal rate.      Heart sounds: Normal heart sounds. No murmur. No friction rub. No gallop.    Pulmonary:      Breath sounds: Normal breath sounds.   Abdominal:      General: Bowel sounds are normal.      Palpations: Abdomen is soft.   Musculoskeletal: Normal range of motion.   Lymphadenopathy:      Cervical: No cervical adenopathy.   Skin:     General: Skin is warm.      Findings: No rash.   Neurological:      Mental Status: She is alert and oriented to person, place, and time.      Cranial Nerves: No cranial nerve deficit.   Psychiatric:         Behavior: Behavior normal.          Laboratory:  CBC:  Recent Labs   Lab Result Units 12/18/20  0705   WBC Thousand/uL 7.5   RBC Million/uL 4.95   Hemoglobin g/dL 13.9   Hematocrit % 43.5   Platelets Thousand/uL 379   MCV fL 87.9   MCH pg 28.1   MCHC g/dL 32.0     CMP:  Recent Labs   Lab Result Units 12/18/20  0705   Glucose mg/dL 90   Calcium mg/dL 8.9   Albumin g/dL 3.4*   Total Protein g/dL 6.8   Sodium mmol/L 142   Potassium mmol/L 4.7   CO2 mmol/L 27   Chloride mmol/L 107   BUN mg/dL 11   ALT U/L 11   AST U/L 11   Total Bilirubin mg/dL 0.4     URINALYSIS:  No results for input(s): COLORU, CLARITYU, SPECGRAV, PHUR, PROTEINUA, GLUCOSEU, BILIRUBINCON, BLOODU, WBCU, RBCU, BACTERIA, MUCUS, NITRITE, LEUKOCYTESUR, UROBILINOGEN, HYALINECASTS in the last 2160 hours.   LIPIDS:  Recent Labs   Lab Result Units 12/18/20  0705   HDL mg/dL 57   Cholesterol mg/dL 204*   Triglycerides mg/dL 95   LDL Cholesterol mg/dL (calc) 127*   HDL/Cholesterol Ratio (calc) 3.6   Non HDL Chol. (LDL+VLDL) mg/dL (calc) 147*     TSH:  No results for input(s): TSH in  the last 2160 hours.  A1C:  No results for input(s): HGBA1C in the last 2160 hours.    Radiology:        Assessment:     Matilde Redding is a 45 y.o.female with:    Annual physical exam    Insomnia, unspecified type  -     traZODone (DESYREL) 100 MG tablet; Take 1 tablet (100 mg total) by mouth every evening.  Dispense: 30 tablet; Refill: 1          Plan:     Problem List Items Addressed This Visit        Other    Annual physical exam - Primary    Overview     pe documented all labs reviewed with patient today   Stressed diet and exercise              Other Visit Diagnoses     Insomnia, unspecified type              As above, continue current medications and maintain follow up with specialists.  Return to clinic in 6 months.      Frederick W Dantagnan [facility] [place]                  Answers for HPI/ROS submitted by the patient on 12/19/2020   activity change: No  unexpected weight change: No  rhinorrhea: No  trouble swallowing: No  visual disturbance: No  chest tightness: No  polyuria: No  difficulty urinating: No  menstrual problem: No  joint swelling: No  arthralgias: No  confusion: No  dysphoric mood: No

## 2020-12-29 ENCOUNTER — TELEPHONE (OUTPATIENT)
Dept: FAMILY MEDICINE | Facility: CLINIC | Age: 45
End: 2020-12-29

## 2020-12-29 ENCOUNTER — PATIENT MESSAGE (OUTPATIENT)
Dept: FAMILY MEDICINE | Facility: CLINIC | Age: 45
End: 2020-12-29

## 2020-12-29 NOTE — TELEPHONE ENCOUNTER
Sent pt a message on [email or web address] to get pt last b/p reading since last visit with Dr. Rodriguez. Per Dr. Allen's instructions.

## 2021-01-13 DIAGNOSIS — Z12.31 OTHER SCREENING MAMMOGRAM: ICD-10-CM

## 2021-03-22 PROBLEM — Z00.00 ANNUAL PHYSICAL EXAM: Status: RESOLVED | Noted: 2019-12-05 | Resolved: 2021-03-22

## 2021-06-17 ENCOUNTER — IMMUNIZATION (OUTPATIENT)
Dept: PRIMARY CARE CLINIC | Facility: CLINIC | Age: 46
End: 2021-06-17
Payer: COMMERCIAL

## 2021-06-17 DIAGNOSIS — Z23 NEED FOR VACCINATION: Primary | ICD-10-CM

## 2021-06-17 PROCEDURE — 0001A COVID-19, MRNA, LNP-S, PF, 30 MCG/0.3 ML DOSE VACCINE: CPT | Mod: CV19,S$GLB,, | Performed by: FAMILY MEDICINE

## 2021-06-17 PROCEDURE — 91300 COVID-19, MRNA, LNP-S, PF, 30 MCG/0.3 ML DOSE VACCINE: CPT | Mod: S$GLB,,, | Performed by: FAMILY MEDICINE

## 2021-06-17 PROCEDURE — 91300 COVID-19, MRNA, LNP-S, PF, 30 MCG/0.3 ML DOSE VACCINE: ICD-10-PCS | Mod: S$GLB,,, | Performed by: FAMILY MEDICINE

## 2021-06-17 PROCEDURE — 0001A COVID-19, MRNA, LNP-S, PF, 30 MCG/0.3 ML DOSE VACCINE: ICD-10-PCS | Mod: CV19,S$GLB,, | Performed by: FAMILY MEDICINE

## 2021-07-08 ENCOUNTER — IMMUNIZATION (OUTPATIENT)
Dept: PRIMARY CARE CLINIC | Facility: CLINIC | Age: 46
End: 2021-07-08
Payer: COMMERCIAL

## 2021-07-08 DIAGNOSIS — Z23 NEED FOR VACCINATION: Primary | ICD-10-CM

## 2021-07-08 PROCEDURE — 91300 COVID-19, MRNA, LNP-S, PF, 30 MCG/0.3 ML DOSE VACCINE: ICD-10-PCS | Mod: S$GLB,,, | Performed by: FAMILY MEDICINE

## 2021-07-08 PROCEDURE — 91300 COVID-19, MRNA, LNP-S, PF, 30 MCG/0.3 ML DOSE VACCINE: CPT | Mod: S$GLB,,, | Performed by: FAMILY MEDICINE

## 2021-07-08 PROCEDURE — 0002A COVID-19, MRNA, LNP-S, PF, 30 MCG/0.3 ML DOSE VACCINE: ICD-10-PCS | Mod: CV19,S$GLB,, | Performed by: FAMILY MEDICINE

## 2021-07-08 PROCEDURE — 0002A COVID-19, MRNA, LNP-S, PF, 30 MCG/0.3 ML DOSE VACCINE: CPT | Mod: CV19,S$GLB,, | Performed by: FAMILY MEDICINE

## 2021-09-13 ENCOUNTER — OFFICE VISIT (OUTPATIENT)
Dept: FAMILY MEDICINE | Facility: CLINIC | Age: 46
End: 2021-09-13
Payer: COMMERCIAL

## 2021-09-13 VITALS
WEIGHT: 263.69 LBS | HEART RATE: 82 BPM | HEIGHT: 72 IN | BODY MASS INDEX: 35.72 KG/M2 | DIASTOLIC BLOOD PRESSURE: 79 MMHG | OXYGEN SATURATION: 99 % | SYSTOLIC BLOOD PRESSURE: 123 MMHG | RESPIRATION RATE: 18 BRPM

## 2021-09-13 DIAGNOSIS — J20.9 ACUTE BRONCHITIS, UNSPECIFIED ORGANISM: Primary | ICD-10-CM

## 2021-09-13 PROCEDURE — 3078F DIAST BP <80 MM HG: CPT | Mod: CPTII,S$GLB,, | Performed by: INTERNAL MEDICINE

## 2021-09-13 PROCEDURE — 3008F PR BODY MASS INDEX (BMI) DOCUMENTED: ICD-10-PCS | Mod: CPTII,S$GLB,, | Performed by: INTERNAL MEDICINE

## 2021-09-13 PROCEDURE — 3008F BODY MASS INDEX DOCD: CPT | Mod: CPTII,S$GLB,, | Performed by: INTERNAL MEDICINE

## 2021-09-13 PROCEDURE — 99999 PR PBB SHADOW E&M-EST. PATIENT-LVL III: ICD-10-PCS | Mod: PBBFAC,,, | Performed by: INTERNAL MEDICINE

## 2021-09-13 PROCEDURE — 1159F MED LIST DOCD IN RCRD: CPT | Mod: CPTII,S$GLB,, | Performed by: INTERNAL MEDICINE

## 2021-09-13 PROCEDURE — 99999 PR PBB SHADOW E&M-EST. PATIENT-LVL III: CPT | Mod: PBBFAC,,, | Performed by: INTERNAL MEDICINE

## 2021-09-13 PROCEDURE — 99214 OFFICE O/P EST MOD 30 MIN: CPT | Mod: S$GLB,,, | Performed by: INTERNAL MEDICINE

## 2021-09-13 PROCEDURE — 99214 PR OFFICE/OUTPT VISIT, EST, LEVL IV, 30-39 MIN: ICD-10-PCS | Mod: S$GLB,,, | Performed by: INTERNAL MEDICINE

## 2021-09-13 PROCEDURE — 3078F PR MOST RECENT DIASTOLIC BLOOD PRESSURE < 80 MM HG: ICD-10-PCS | Mod: CPTII,S$GLB,, | Performed by: INTERNAL MEDICINE

## 2021-09-13 PROCEDURE — 1159F PR MEDICATION LIST DOCUMENTED IN MEDICAL RECORD: ICD-10-PCS | Mod: CPTII,S$GLB,, | Performed by: INTERNAL MEDICINE

## 2021-09-13 PROCEDURE — 1160F PR REVIEW ALL MEDS BY PRESCRIBER/CLIN PHARMACIST DOCUMENTED: ICD-10-PCS | Mod: CPTII,S$GLB,, | Performed by: INTERNAL MEDICINE

## 2021-09-13 PROCEDURE — 1160F RVW MEDS BY RX/DR IN RCRD: CPT | Mod: CPTII,S$GLB,, | Performed by: INTERNAL MEDICINE

## 2021-09-13 PROCEDURE — 4010F ACE/ARB THERAPY RXD/TAKEN: CPT | Mod: CPTII,S$GLB,, | Performed by: INTERNAL MEDICINE

## 2021-09-13 PROCEDURE — 3074F PR MOST RECENT SYSTOLIC BLOOD PRESSURE < 130 MM HG: ICD-10-PCS | Mod: CPTII,S$GLB,, | Performed by: INTERNAL MEDICINE

## 2021-09-13 PROCEDURE — 3074F SYST BP LT 130 MM HG: CPT | Mod: CPTII,S$GLB,, | Performed by: INTERNAL MEDICINE

## 2021-09-13 PROCEDURE — 4010F PR ACE/ARB THEARPY RXD/TAKEN: ICD-10-PCS | Mod: CPTII,S$GLB,, | Performed by: INTERNAL MEDICINE

## 2021-09-13 RX ORDER — BENZONATATE 200 MG/1
200 CAPSULE ORAL 3 TIMES DAILY PRN
Qty: 30 CAPSULE | Refills: 0 | Status: SHIPPED | OUTPATIENT
Start: 2021-09-13 | End: 2021-09-23 | Stop reason: SDUPTHER

## 2021-09-13 RX ORDER — PROMETHAZINE HYDROCHLORIDE AND DEXTROMETHORPHAN HYDROBROMIDE 6.25; 15 MG/5ML; MG/5ML
5 SYRUP ORAL EVERY 6 HOURS PRN
Qty: 120 ML | Refills: 0 | Status: SHIPPED | OUTPATIENT
Start: 2021-09-13 | End: 2021-09-23 | Stop reason: SDUPTHER

## 2021-09-13 RX ORDER — AZITHROMYCIN 250 MG/1
TABLET, FILM COATED ORAL
Qty: 6 TABLET | Refills: 0 | Status: SHIPPED | OUTPATIENT
Start: 2021-09-13 | End: 2021-09-18

## 2021-09-13 RX ORDER — METHYLPREDNISOLONE 4 MG/1
TABLET ORAL
Qty: 1 PACKAGE | Refills: 0 | Status: SHIPPED | OUTPATIENT
Start: 2021-09-13 | End: 2021-10-04

## 2021-09-15 DIAGNOSIS — I10 BENIGN ESSENTIAL HYPERTENSION: ICD-10-CM

## 2021-09-15 RX ORDER — LISINOPRIL 10 MG/1
10 TABLET ORAL DAILY
Qty: 30 TABLET | Refills: 5 | Status: SHIPPED | OUTPATIENT
Start: 2021-09-15 | End: 2022-03-15 | Stop reason: SDUPTHER

## 2021-09-15 NOTE — TELEPHONE ENCOUNTER
----- Message from Evelyne Sosa sent at 9/15/2021  9:47 AM CDT -----  Regarding: Refills  Contact: [phone]  Type:  RX Refill Request    Who Called: PT   Refill or New Rx:Refills   RX Name and Strength: lisinopriL 10 MG tablet 90 tablet   How is the patient currently taking it? (ex. 1XDay): 1 x day   Is this a 30 day or 90 day RX: 90   Preferred Pharmacy with phone number: [id number] [place] [id number] [facility] Phone: [phone] Fax:  [phone]

## 2021-09-23 ENCOUNTER — TELEPHONE (OUTPATIENT)
Dept: FAMILY MEDICINE | Facility: CLINIC | Age: 46
End: 2021-09-23

## 2021-09-23 DIAGNOSIS — J20.9 ACUTE BRONCHITIS, UNSPECIFIED ORGANISM: ICD-10-CM

## 2021-09-23 RX ORDER — BENZONATATE 200 MG/1
200 CAPSULE ORAL 3 TIMES DAILY PRN
Qty: 30 CAPSULE | Refills: 0 | Status: SHIPPED | OUTPATIENT
Start: 2021-09-23 | End: 2021-10-03

## 2021-09-23 RX ORDER — PROMETHAZINE HYDROCHLORIDE AND DEXTROMETHORPHAN HYDROBROMIDE 6.25; 15 MG/5ML; MG/5ML
5 SYRUP ORAL EVERY 6 HOURS PRN
Qty: 120 ML | Refills: 0 | Status: SHIPPED | OUTPATIENT
Start: 2021-09-23 | End: 2021-10-03

## 2021-10-25 ENCOUNTER — PATIENT MESSAGE (OUTPATIENT)
Dept: FAMILY MEDICINE | Facility: CLINIC | Age: 46
End: 2021-10-25
Payer: COMMERCIAL

## 2021-10-25 DIAGNOSIS — R05.3 CHRONIC COUGH: Primary | ICD-10-CM

## 2021-10-26 ENCOUNTER — PATIENT MESSAGE (OUTPATIENT)
Dept: FAMILY MEDICINE | Facility: CLINIC | Age: 46
End: 2021-10-26
Payer: COMMERCIAL

## 2021-11-26 ENCOUNTER — TELEPHONE (OUTPATIENT)
Dept: FAMILY MEDICINE | Facility: CLINIC | Age: 46
End: 2021-11-26
Payer: COMMERCIAL

## 2021-12-04 ENCOUNTER — PATIENT MESSAGE (OUTPATIENT)
Dept: INTERNAL MEDICINE | Facility: CLINIC | Age: 46
End: 2021-12-04
Payer: COMMERCIAL

## 2021-12-04 DIAGNOSIS — Z00.00 ANNUAL PHYSICAL EXAM: Primary | ICD-10-CM

## 2021-12-20 ENCOUNTER — PATIENT MESSAGE (OUTPATIENT)
Dept: INTERNAL MEDICINE | Facility: CLINIC | Age: 46
End: 2021-12-20
Payer: COMMERCIAL

## 2021-12-21 ENCOUNTER — TELEPHONE (OUTPATIENT)
Dept: FAMILY MEDICINE | Facility: CLINIC | Age: 46
End: 2021-12-21
Payer: COMMERCIAL

## 2021-12-21 DIAGNOSIS — Z00.00 ANNUAL PHYSICAL EXAM: Primary | ICD-10-CM

## 2021-12-22 ENCOUNTER — LAB VISIT (OUTPATIENT)
Dept: LAB | Facility: HOSPITAL | Age: 46
End: 2021-12-22
Attending: INTERNAL MEDICINE
Payer: COMMERCIAL

## 2021-12-22 DIAGNOSIS — Z00.00 ANNUAL PHYSICAL EXAM: ICD-10-CM

## 2021-12-22 LAB
ALBUMIN SERPL BCP-MCNC: 3.1 G/DL (ref 3.5–5.2)
ALP SERPL-CCNC: 70 U/L (ref 55–135)
ALT SERPL W/O P-5'-P-CCNC: 11 U/L (ref 10–44)
ANION GAP SERPL CALC-SCNC: 9 MMOL/L (ref 8–16)
AST SERPL-CCNC: 10 U/L (ref 10–40)
BASOPHILS # BLD AUTO: 0.1 K/UL (ref 0–0.2)
BASOPHILS NFR BLD: 1.2 % (ref 0–1.9)
BILIRUB SERPL-MCNC: 0.7 MG/DL (ref 0.1–1)
BUN SERPL-MCNC: 10 MG/DL (ref 6–20)
CALCIUM SERPL-MCNC: 8.6 MG/DL (ref 8.7–10.5)
CHLORIDE SERPL-SCNC: 103 MMOL/L (ref 95–110)
CHOLEST SERPL-MCNC: 196 MG/DL (ref 120–199)
CHOLEST/HDLC SERPL: 3.6 {RATIO} (ref 2–5)
CO2 SERPL-SCNC: 25 MMOL/L (ref 23–29)
CREAT SERPL-MCNC: 0.8 MG/DL (ref 0.5–1.4)
DIFFERENTIAL METHOD: ABNORMAL
EOSINOPHIL # BLD AUTO: 0.3 K/UL (ref 0–0.5)
EOSINOPHIL NFR BLD: 3.3 % (ref 0–8)
ERYTHROCYTE [DISTWIDTH] IN BLOOD BY AUTOMATED COUNT: 13.3 % (ref 11.5–14.5)
EST. GFR  (AFRICAN AMERICAN): >60 ML/MIN/1.73 M^2
EST. GFR  (NON AFRICAN AMERICAN): >60 ML/MIN/1.73 M^2
GLUCOSE SERPL-MCNC: 93 MG/DL (ref 70–110)
HCT VFR BLD AUTO: 39.8 % (ref 37–48.5)
HDLC SERPL-MCNC: 55 MG/DL (ref 40–75)
HDLC SERPL: 28.1 % (ref 20–50)
HGB BLD-MCNC: 12.6 G/DL (ref 12–16)
IMM GRANULOCYTES # BLD AUTO: 0.02 K/UL (ref 0–0.04)
IMM GRANULOCYTES NFR BLD AUTO: 0.2 % (ref 0–0.5)
LDLC SERPL CALC-MCNC: 123.4 MG/DL (ref 63–159)
LYMPHOCYTES # BLD AUTO: 2.1 K/UL (ref 1–4.8)
LYMPHOCYTES NFR BLD: 24.5 % (ref 18–48)
MCH RBC QN AUTO: 28.3 PG (ref 27–31)
MCHC RBC AUTO-ENTMCNC: 31.7 G/DL (ref 32–36)
MCV RBC AUTO: 89 FL (ref 82–98)
MONOCYTES # BLD AUTO: 0.6 K/UL (ref 0.3–1)
MONOCYTES NFR BLD: 6.7 % (ref 4–15)
NEUTROPHILS # BLD AUTO: 5.6 K/UL (ref 1.8–7.7)
NEUTROPHILS NFR BLD: 64.1 % (ref 38–73)
NONHDLC SERPL-MCNC: 141 MG/DL
NRBC BLD-RTO: 0 /100 WBC
PLATELET # BLD AUTO: 368 K/UL (ref 150–450)
PMV BLD AUTO: 9.5 FL (ref 9.2–12.9)
POTASSIUM SERPL-SCNC: 4.9 MMOL/L (ref 3.5–5.1)
PROT SERPL-MCNC: 6.8 G/DL (ref 6–8.4)
RBC # BLD AUTO: 4.46 M/UL (ref 4–5.4)
SODIUM SERPL-SCNC: 137 MMOL/L (ref 136–145)
TRIGL SERPL-MCNC: 88 MG/DL (ref 30–150)
TSH SERPL DL<=0.005 MIU/L-ACNC: 1.93 UIU/ML (ref 0.4–4)
WBC # BLD AUTO: 8.68 K/UL (ref 3.9–12.7)

## 2021-12-22 PROCEDURE — 80053 COMPREHEN METABOLIC PANEL: CPT | Performed by: INTERNAL MEDICINE

## 2021-12-22 PROCEDURE — 84443 ASSAY THYROID STIM HORMONE: CPT | Performed by: INTERNAL MEDICINE

## 2021-12-22 PROCEDURE — 85025 COMPLETE CBC W/AUTO DIFF WBC: CPT | Performed by: INTERNAL MEDICINE

## 2021-12-22 PROCEDURE — 36415 COLL VENOUS BLD VENIPUNCTURE: CPT | Mod: PO | Performed by: INTERNAL MEDICINE

## 2021-12-22 PROCEDURE — 80061 LIPID PANEL: CPT | Performed by: INTERNAL MEDICINE

## 2021-12-27 ENCOUNTER — OFFICE VISIT (OUTPATIENT)
Dept: INTERNAL MEDICINE | Facility: CLINIC | Age: 46
End: 2021-12-27
Payer: COMMERCIAL

## 2021-12-27 VITALS
SYSTOLIC BLOOD PRESSURE: 122 MMHG | HEART RATE: 68 BPM | DIASTOLIC BLOOD PRESSURE: 80 MMHG | TEMPERATURE: 99 F | HEIGHT: 72 IN | RESPIRATION RATE: 18 BRPM | BODY MASS INDEX: 36.58 KG/M2 | WEIGHT: 270.06 LBS | OXYGEN SATURATION: 98 %

## 2021-12-27 DIAGNOSIS — Z00.00 ANNUAL PHYSICAL EXAM: Primary | ICD-10-CM

## 2021-12-27 DIAGNOSIS — J01.10 ACUTE NON-RECURRENT FRONTAL SINUSITIS: ICD-10-CM

## 2021-12-27 PROCEDURE — 99999 PR PBB SHADOW E&M-EST. PATIENT-LVL III: ICD-10-PCS | Mod: PBBFAC,,, | Performed by: INTERNAL MEDICINE

## 2021-12-27 PROCEDURE — 1160F PR REVIEW ALL MEDS BY PRESCRIBER/CLIN PHARMACIST DOCUMENTED: ICD-10-PCS | Mod: CPTII,S$GLB,, | Performed by: INTERNAL MEDICINE

## 2021-12-27 PROCEDURE — 3079F DIAST BP 80-89 MM HG: CPT | Mod: CPTII,S$GLB,, | Performed by: INTERNAL MEDICINE

## 2021-12-27 PROCEDURE — 3008F PR BODY MASS INDEX (BMI) DOCUMENTED: ICD-10-PCS | Mod: CPTII,S$GLB,, | Performed by: INTERNAL MEDICINE

## 2021-12-27 PROCEDURE — 4010F ACE/ARB THERAPY RXD/TAKEN: CPT | Mod: CPTII,S$GLB,, | Performed by: INTERNAL MEDICINE

## 2021-12-27 PROCEDURE — 1159F PR MEDICATION LIST DOCUMENTED IN MEDICAL RECORD: ICD-10-PCS | Mod: CPTII,S$GLB,, | Performed by: INTERNAL MEDICINE

## 2021-12-27 PROCEDURE — 99396 PREV VISIT EST AGE 40-64: CPT | Mod: S$GLB,,, | Performed by: INTERNAL MEDICINE

## 2021-12-27 PROCEDURE — 99396 PR PREVENTIVE VISIT,EST,40-64: ICD-10-PCS | Mod: S$GLB,,, | Performed by: INTERNAL MEDICINE

## 2021-12-27 PROCEDURE — 3079F PR MOST RECENT DIASTOLIC BLOOD PRESSURE 80-89 MM HG: ICD-10-PCS | Mod: CPTII,S$GLB,, | Performed by: INTERNAL MEDICINE

## 2021-12-27 PROCEDURE — 1160F RVW MEDS BY RX/DR IN RCRD: CPT | Mod: CPTII,S$GLB,, | Performed by: INTERNAL MEDICINE

## 2021-12-27 PROCEDURE — 3008F BODY MASS INDEX DOCD: CPT | Mod: CPTII,S$GLB,, | Performed by: INTERNAL MEDICINE

## 2021-12-27 PROCEDURE — 1159F MED LIST DOCD IN RCRD: CPT | Mod: CPTII,S$GLB,, | Performed by: INTERNAL MEDICINE

## 2021-12-27 PROCEDURE — 3074F SYST BP LT 130 MM HG: CPT | Mod: CPTII,S$GLB,, | Performed by: INTERNAL MEDICINE

## 2021-12-27 PROCEDURE — 3074F PR MOST RECENT SYSTOLIC BLOOD PRESSURE < 130 MM HG: ICD-10-PCS | Mod: CPTII,S$GLB,, | Performed by: INTERNAL MEDICINE

## 2021-12-27 PROCEDURE — 99999 PR PBB SHADOW E&M-EST. PATIENT-LVL III: CPT | Mod: PBBFAC,,, | Performed by: INTERNAL MEDICINE

## 2021-12-27 PROCEDURE — 4010F PR ACE/ARB THEARPY RXD/TAKEN: ICD-10-PCS | Mod: CPTII,S$GLB,, | Performed by: INTERNAL MEDICINE

## 2021-12-27 RX ORDER — OMEPRAZOLE 20 MG/1
CAPSULE, DELAYED RELEASE ORAL
COMMUNITY
Start: 2021-11-01 | End: 2022-05-23 | Stop reason: ALTCHOICE

## 2021-12-27 RX ORDER — BROMPHENIRAMINE MALEATE, PSEUDOEPHEDRINE HYDROCHLORIDE, AND DEXTROMETHORPHAN HYDROBROMIDE 2; 30; 10 MG/5ML; MG/5ML; MG/5ML
5 SYRUP ORAL 4 TIMES DAILY PRN
Qty: 118 ML | Refills: 0 | Status: SHIPPED | OUTPATIENT
Start: 2021-12-27 | End: 2022-01-06

## 2021-12-27 RX ORDER — AMOXICILLIN 500 MG/1
500 TABLET, FILM COATED ORAL EVERY 12 HOURS
Qty: 20 TABLET | Refills: 0 | Status: SHIPPED | OUTPATIENT
Start: 2021-12-27 | End: 2022-01-06

## 2022-01-10 ENCOUNTER — PATIENT MESSAGE (OUTPATIENT)
Dept: ADMINISTRATIVE | Facility: HOSPITAL | Age: 47
End: 2022-01-10
Payer: COMMERCIAL

## 2022-01-12 ENCOUNTER — IMMUNIZATION (OUTPATIENT)
Dept: PRIMARY CARE CLINIC | Facility: CLINIC | Age: 47
End: 2022-01-12
Payer: COMMERCIAL

## 2022-01-12 ENCOUNTER — TELEPHONE (OUTPATIENT)
Dept: ADMINISTRATIVE | Facility: HOSPITAL | Age: 47
End: 2022-01-12
Payer: COMMERCIAL

## 2022-01-12 ENCOUNTER — PATIENT OUTREACH (OUTPATIENT)
Dept: ADMINISTRATIVE | Facility: HOSPITAL | Age: 47
End: 2022-01-12
Payer: COMMERCIAL

## 2022-01-12 DIAGNOSIS — Z23 NEED FOR VACCINATION: Primary | ICD-10-CM

## 2022-01-12 PROCEDURE — 91300 COVID-19, MRNA, LNP-S, PF, 30 MCG/0.3 ML DOSE VACCINE: CPT | Mod: S$GLB,,, | Performed by: FAMILY MEDICINE

## 2022-01-12 PROCEDURE — 91300 COVID-19, MRNA, LNP-S, PF, 30 MCG/0.3 ML DOSE VACCINE: ICD-10-PCS | Mod: S$GLB,,, | Performed by: FAMILY MEDICINE

## 2022-01-12 PROCEDURE — 0004A COVID-19, MRNA, LNP-S, PF, 30 MCG/0.3 ML DOSE VACCINE: ICD-10-PCS | Mod: S$GLB,,, | Performed by: FAMILY MEDICINE

## 2022-01-12 PROCEDURE — 0004A COVID-19, MRNA, LNP-S, PF, 30 MCG/0.3 ML DOSE VACCINE: CPT | Mod: S$GLB,,, | Performed by: FAMILY MEDICINE

## 2022-03-10 DIAGNOSIS — I10 BENIGN ESSENTIAL HYPERTENSION: ICD-10-CM

## 2022-03-11 NOTE — TELEPHONE ENCOUNTER
No new care gaps identified.  Powered by [employer]. Reference number: [id number].   3/10/2022 9:23:47 PM CST

## 2022-03-15 DIAGNOSIS — I10 BENIGN ESSENTIAL HYPERTENSION: ICD-10-CM

## 2022-03-15 RX ORDER — LISINOPRIL 10 MG/1
10 TABLET ORAL DAILY
Qty: 30 TABLET | Refills: 5 | Status: SHIPPED | OUTPATIENT
Start: 2022-03-15 | End: 2022-06-09

## 2022-03-15 NOTE — TELEPHONE ENCOUNTER
----- Message from Gayathri Saunders sent at 3/15/2022  8:48 AM CDT -----  Regarding: refill  Contact: [phone]  Type:  RX Refill Request    Who Called:  self   Refill or New Rx: refill  RX Name and Strength: lisinopriL 10 MG tablet  How is the patient currently taking it? (ex. 1XDay): Take 1 tablet (10 mg total) by mouth once daily. - Oral  Is this a 30 day or 90 day RX: 30/5 refills   Preferred Pharmacy with phone number: [facility] #[id number] - [place], [place] - [address]  Local or Mail Order: local   Ordering Provider:   Would the patient rather a call back or a response via [facility]?  call  Best Call Back Number: [phone]  Additional Information:

## 2022-03-15 NOTE — TELEPHONE ENCOUNTER
No new care gaps identified.  Powered by [employer]. Reference number: [id number].   3/15/2022 8:55:17 AM CDT

## 2022-03-16 ENCOUNTER — PATIENT MESSAGE (OUTPATIENT)
Dept: ADMINISTRATIVE | Facility: OTHER | Age: 47
End: 2022-03-16
Payer: COMMERCIAL

## 2022-03-18 RX ORDER — LISINOPRIL 10 MG/1
TABLET ORAL
Qty: 30 TABLET | Refills: 5 | OUTPATIENT
Start: 2022-03-18

## 2022-03-21 ENCOUNTER — PATIENT MESSAGE (OUTPATIENT)
Dept: ADMINISTRATIVE | Facility: HOSPITAL | Age: 47
End: 2022-03-21
Payer: COMMERCIAL

## 2022-04-12 ENCOUNTER — TELEPHONE (OUTPATIENT)
Dept: INTERNAL MEDICINE | Facility: CLINIC | Age: 47
End: 2022-04-12
Payer: COMMERCIAL
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
## 2022-04-12 NOTE — TELEPHONE ENCOUNTER
----- Message from Evelyne Sosa sent at 4/12/2022  3:07 PM CDT -----  Regarding: call back  Contact: [phone]  Who Called: PT     Patient is calling to talk to nurse in regards to having trouble eating complete loss of appetite and is concern since when she does try to eat feels really sick.
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
## 2022-04-14 ENCOUNTER — OFFICE VISIT (OUTPATIENT)
Dept: INTERNAL MEDICINE | Facility: CLINIC | Age: 47
End: 2022-04-14
Payer: COMMERCIAL

## 2022-04-14 ENCOUNTER — LAB VISIT (OUTPATIENT)
Dept: LAB | Facility: HOSPITAL | Age: 47
End: 2022-04-14
Attending: INTERNAL MEDICINE
Payer: COMMERCIAL

## 2022-04-14 VITALS
OXYGEN SATURATION: 98 % | SYSTOLIC BLOOD PRESSURE: 118 MMHG | RESPIRATION RATE: 18 BRPM | BODY MASS INDEX: 34.39 KG/M2 | DIASTOLIC BLOOD PRESSURE: 74 MMHG | HEIGHT: 72 IN | WEIGHT: 253.88 LBS | TEMPERATURE: 98 F | HEART RATE: 88 BPM

## 2022-04-14 DIAGNOSIS — R63.0 LOSS OF APPETITE: ICD-10-CM

## 2022-04-14 DIAGNOSIS — F43.9 STRESS AT HOME: Primary | ICD-10-CM

## 2022-04-14 DIAGNOSIS — Z56.6 STRESS AT WORK: ICD-10-CM

## 2022-04-14 LAB
ALBUMIN SERPL BCP-MCNC: 3.6 G/DL (ref 3.5–5.2)
ALP SERPL-CCNC: 85 U/L (ref 55–135)
ALT SERPL W/O P-5'-P-CCNC: 36 U/L (ref 10–44)
ANION GAP SERPL CALC-SCNC: 13 MMOL/L (ref 8–16)
AST SERPL-CCNC: 26 U/L (ref 10–40)
BASOPHILS # BLD AUTO: 0.05 K/UL (ref 0–0.2)
BASOPHILS NFR BLD: 0.6 % (ref 0–1.9)
BILIRUB SERPL-MCNC: 0.7 MG/DL (ref 0.1–1)
BUN SERPL-MCNC: 9 MG/DL (ref 6–20)
CALCIUM SERPL-MCNC: 9.8 MG/DL (ref 8.7–10.5)
CHLORIDE SERPL-SCNC: 99 MMOL/L (ref 95–110)
CO2 SERPL-SCNC: 25 MMOL/L (ref 23–29)
CREAT SERPL-MCNC: 0.8 MG/DL (ref 0.5–1.4)
DIFFERENTIAL METHOD: NORMAL
EOSINOPHIL # BLD AUTO: 0.1 K/UL (ref 0–0.5)
EOSINOPHIL NFR BLD: 0.7 % (ref 0–8)
ERYTHROCYTE [DISTWIDTH] IN BLOOD BY AUTOMATED COUNT: 13.3 % (ref 11.5–14.5)
EST. GFR  (AFRICAN AMERICAN): >60 ML/MIN/1.73 M^2
EST. GFR  (NON AFRICAN AMERICAN): >60 ML/MIN/1.73 M^2
GLUCOSE SERPL-MCNC: 101 MG/DL (ref 70–110)
HCT VFR BLD AUTO: 41.1 % (ref 37–48.5)
HGB BLD-MCNC: 13.2 G/DL (ref 12–16)
IMM GRANULOCYTES # BLD AUTO: 0.02 K/UL (ref 0–0.04)
IMM GRANULOCYTES NFR BLD AUTO: 0.2 % (ref 0–0.5)
LYMPHOCYTES # BLD AUTO: 3 K/UL (ref 1–4.8)
LYMPHOCYTES NFR BLD: 33.3 % (ref 18–48)
MCH RBC QN AUTO: 27.3 PG (ref 27–31)
MCHC RBC AUTO-ENTMCNC: 32.1 G/DL (ref 32–36)
MCV RBC AUTO: 85 FL (ref 82–98)
MONOCYTES # BLD AUTO: 0.5 K/UL (ref 0.3–1)
MONOCYTES NFR BLD: 5.1 % (ref 4–15)
NEUTROPHILS # BLD AUTO: 5.4 K/UL (ref 1.8–7.7)
NEUTROPHILS NFR BLD: 60.1 % (ref 38–73)
NRBC BLD-RTO: 0 /100 WBC
PLATELET # BLD AUTO: 384 K/UL (ref 150–450)
PMV BLD AUTO: 10.3 FL (ref 9.2–12.9)
POTASSIUM SERPL-SCNC: 3.9 MMOL/L (ref 3.5–5.1)
PROT SERPL-MCNC: 7.4 G/DL (ref 6–8.4)
RBC # BLD AUTO: 4.83 M/UL (ref 4–5.4)
SODIUM SERPL-SCNC: 137 MMOL/L (ref 136–145)
TSH SERPL DL<=0.005 MIU/L-ACNC: 1.19 UIU/ML (ref 0.4–4)
WBC # BLD AUTO: 8.98 K/UL (ref 3.9–12.7)

## 2022-04-14 PROCEDURE — 99999 PR PBB SHADOW E&M-EST. PATIENT-LVL IV: CPT | Mod: PBBFAC,,, | Performed by: INTERNAL MEDICINE

## 2022-04-14 PROCEDURE — 1159F MED LIST DOCD IN RCRD: CPT | Mod: CPTII,S$GLB,, | Performed by: INTERNAL MEDICINE

## 2022-04-14 PROCEDURE — 3008F PR BODY MASS INDEX (BMI) DOCUMENTED: ICD-10-PCS | Mod: CPTII,S$GLB,, | Performed by: INTERNAL MEDICINE

## 2022-04-14 PROCEDURE — 4010F PR ACE/ARB THEARPY RXD/TAKEN: ICD-10-PCS | Mod: CPTII,S$GLB,, | Performed by: INTERNAL MEDICINE

## 2022-04-14 PROCEDURE — 85025 COMPLETE CBC W/AUTO DIFF WBC: CPT | Performed by: INTERNAL MEDICINE

## 2022-04-14 PROCEDURE — 3074F PR MOST RECENT SYSTOLIC BLOOD PRESSURE < 130 MM HG: ICD-10-PCS | Mod: CPTII,S$GLB,, | Performed by: INTERNAL MEDICINE

## 2022-04-14 PROCEDURE — 84443 ASSAY THYROID STIM HORMONE: CPT | Performed by: INTERNAL MEDICINE

## 2022-04-14 PROCEDURE — 99214 PR OFFICE/OUTPT VISIT, EST, LEVL IV, 30-39 MIN: ICD-10-PCS | Mod: S$GLB,,, | Performed by: INTERNAL MEDICINE

## 2022-04-14 PROCEDURE — 1160F RVW MEDS BY RX/DR IN RCRD: CPT | Mod: CPTII,S$GLB,, | Performed by: INTERNAL MEDICINE

## 2022-04-14 PROCEDURE — 4010F ACE/ARB THERAPY RXD/TAKEN: CPT | Mod: CPTII,S$GLB,, | Performed by: INTERNAL MEDICINE

## 2022-04-14 PROCEDURE — 3078F DIAST BP <80 MM HG: CPT | Mod: CPTII,S$GLB,, | Performed by: INTERNAL MEDICINE

## 2022-04-14 PROCEDURE — 3008F BODY MASS INDEX DOCD: CPT | Mod: CPTII,S$GLB,, | Performed by: INTERNAL MEDICINE

## 2022-04-14 PROCEDURE — 99214 OFFICE O/P EST MOD 30 MIN: CPT | Mod: S$GLB,,, | Performed by: INTERNAL MEDICINE

## 2022-04-14 PROCEDURE — 36415 COLL VENOUS BLD VENIPUNCTURE: CPT | Performed by: INTERNAL MEDICINE

## 2022-04-14 PROCEDURE — 3078F PR MOST RECENT DIASTOLIC BLOOD PRESSURE < 80 MM HG: ICD-10-PCS | Mod: CPTII,S$GLB,, | Performed by: INTERNAL MEDICINE

## 2022-04-14 PROCEDURE — 1160F PR REVIEW ALL MEDS BY PRESCRIBER/CLIN PHARMACIST DOCUMENTED: ICD-10-PCS | Mod: CPTII,S$GLB,, | Performed by: INTERNAL MEDICINE

## 2022-04-14 PROCEDURE — 1159F PR MEDICATION LIST DOCUMENTED IN MEDICAL RECORD: ICD-10-PCS | Mod: CPTII,S$GLB,, | Performed by: INTERNAL MEDICINE

## 2022-04-14 PROCEDURE — 3074F SYST BP LT 130 MM HG: CPT | Mod: CPTII,S$GLB,, | Performed by: INTERNAL MEDICINE

## 2022-04-14 PROCEDURE — 80053 COMPREHEN METABOLIC PANEL: CPT | Performed by: INTERNAL MEDICINE

## 2022-04-14 PROCEDURE — 99999 PR PBB SHADOW E&M-EST. PATIENT-LVL IV: ICD-10-PCS | Mod: PBBFAC,,, | Performed by: INTERNAL MEDICINE

## 2022-04-14 RX ORDER — BUPROPION HYDROCHLORIDE 150 MG/1
150 TABLET ORAL DAILY
Qty: 30 TABLET | Refills: 0 | Status: SHIPPED | OUTPATIENT
Start: 2022-04-14 | End: 2022-05-12 | Stop reason: SDUPTHER

## 2022-04-14 RX ORDER — NORETHINDRONE AND ETHINYL ESTRADIOL 7-9-5
KIT ORAL
COMMUNITY

## 2022-04-14 SDOH — SOCIAL DETERMINANTS OF HEALTH (SDOH): OTHER PHYSICAL AND MENTAL STRAIN RELATED TO WORK: Z56.6

## 2022-04-14 NOTE — PROGRESS NOTES
[facility] Note    Chief Complaint      Chief Complaint   Patient presents with    Stress     LOSS OF APPETITE       History of Present Illness      Matilde Redding is a 47 y.o. female who presents today for   Chief Complaint   Patient presents with    Stress     LOSS OF APPETITE   .  Patient comes to appointment here for above . She states she has been under stress from work and home and in the last  2 weeks has completely lost her appetite . She has not appetite at all when she tries to eat after a few bites gets nauseas . She is able to drink fluids. She denies difficulty with swallowing , denies melena or hematochezia .     Problem List Items Addressed This Visit        Psychiatric    Stress at work    Overview     See below              Other    Stress at home - Primary    Overview     Start welbutrin 150 mg po qam  rtc in 1m            Loss of appetite    Overview     Check cmp , tsh , see above                   Past Medical History:  History reviewed. No pertinent past medical history.    Past Surgical History:  Past Surgical History:   Procedure Laterality Date    HYSTERECTOMY  6/2017    Ovary removed and fallopian tubes removed    PARTIAL HYSTERECTOMY  2017    WISDOM TOOTH EXTRACTION         Family History:  family history includes Cancer in her father; Heart disease in her father; Hypertension in her father and mother.    Social History:  Social History     Socioeconomic History    Marital status:    Tobacco Use    Smoking status: Never Smoker    Smokeless tobacco: Never Used   Substance and Sexual Activity    Alcohol use: Yes     Alcohol/week: 2.0 standard drinks     Types: 1 Glasses of wine, 1 Cans of beer per week    Drug use: Never    Sexual activity: Not Currently     Partners: Male     Birth control/protection: Other-see comments     Comment: Birth control pill     Social Determinants of Health     Financial Resource Strain: Low Risk     Difficulty of  Paying Living Expenses: Not hard at all   Food Insecurity: No Food Insecurity    Worried About Running Out of Food in the Last Year: Never true    Ran Out of Food in the Last Year: Never true   Transportation Needs: No Transportation Needs    Lack of Transportation (Medical): No    Lack of Transportation (Non-Medical): No   Physical Activity: Inactive    Days of Exercise per Week: 0 days    Minutes of Exercise per Session: 0 min   Stress: Stress Concern Present    Feeling of Stress : Very much   Social Connections: Unknown    Frequency of Communication with Friends and Family: Once a week    Frequency of Social Gatherings with Friends and Family: Never    Active Member of Clubs or Organizations: No    Attends Club or Organization Meetings: Never    Marital Status:    Housing Stability: Low Risk     Unable to Pay for Housing in the Last Year: No    Number of Places Lived in the Last Year: 1    Unstable Housing in the Last Year: No       Review of Systems:   Review of Systems   Constitutional: Positive for weight loss. Negative for chills and fever.   HENT: Negative for congestion and sinus pain.    Respiratory: Negative for cough and shortness of breath.    Cardiovascular: Negative for chest pain and leg swelling.   Gastrointestinal: Positive for diarrhea. Negative for abdominal pain, constipation, heartburn and nausea.   Neurological: Negative for dizziness and headaches.   Psychiatric/Behavioral: Negative for depression. The patient is nervous/anxious.          Medications:  Outpatient Encounter Medications as of 4/14/2022   Medication Sig Dispense Refill    cetirizine (ZYRTEC) 10 MG tablet Take 10 mg by mouth once daily.      lisinopriL 10 MG tablet Take 1 tablet (10 mg total) by mouth once daily. 30 tablet 5    norethin-e.estradiol triphasic (ARANELLE, Cierra,) 0.5/1/0.5-35 mg-mcg Tab       omeprazole (PRILOSEC) 20 MG capsule TAKE 1 CAPSULE BY MOUTH 30 MINUTES BEFORE DINNER      buPROPion  (WELLBUTRIN XL) 150 MG TB24 tablet Take 1 tablet (150 mg total) by mouth once daily. 30 tablet 0    [DISCONTINUED] norethin-e.estradiol triphasic (TRI-NORINYL, 28,) 0.5/1/0.5-35 mg-mcg Tab Take 1 tablet by mouth.      [DISCONTINUED] traZODone (DESYREL) 100 MG tablet Take 1 tablet (100 mg total) by mouth every evening. 30 tablet 1     No facility-administered encounter medications on file as of 4/14/2022.        Allergies:  Review of patient's allergies indicates:  No Known Allergies      Physical Exam         Vitals:    04/14/22 1059   BP: 118/74   Pulse: 88   Resp: 18   Temp: 97.7 °F (36.5 °C)         Physical Exam  Constitutional:       General: She is not in acute distress.     Appearance: Normal appearance. She is well-developed. She is not ill-appearing.   Eyes:      Pupils: Pupils are equal, round, and reactive to light.   Neck:      Thyroid: No thyromegaly.   Cardiovascular:      Rate and Rhythm: Normal rate.      Heart sounds: Normal heart sounds. No murmur heard.    No friction rub. No gallop.   Pulmonary:      Breath sounds: Normal breath sounds.   Abdominal:      General: Bowel sounds are normal.      Palpations: Abdomen is soft.   Musculoskeletal:         General: Normal range of motion.      Cervical back: Normal range of motion.   Lymphadenopathy:      Cervical: No cervical adenopathy.   Skin:     General: Skin is warm.      Findings: No rash.   Neurological:      Mental Status: She is alert and oriented to person, place, and time.      Cranial Nerves: No cranial nerve deficit.   Psychiatric:         Mood and Affect: Mood is anxious and depressed.         Behavior: Behavior normal.          Laboratory:  CBC:  No results for input(s): WBC, RBC, HGB, HCT, PLT, MCV, MCH, MCHC in the last 2160 hours.  CMP:  No results for input(s): GLU, CALCIUM, ALBUMIN, PROT, NA, K, CO2, CL, BUN, ALKPHOS, ALT, AST, BILITOT in the last 2160 hours.    Invalid input(s): CREATININ  URINALYSIS:  No results for input(s):  COLORU, CLARITYU, SPECGRAV, PHUR, PROTEINUA, GLUCOSEU, BILIRUBINCON, BLOODU, WBCU, RBCU, BACTERIA, MUCUS, NITRITE, LEUKOCYTESUR, UROBILINOGEN, HYALINECASTS in the last 2160 hours.   LIPIDS:  No results for input(s): TSH, HDL, CHOL, TRIG, LDLCALC, CHOLHDL, NONHDLCHOL, TOTALCHOLEST in the last 2160 hours.  TSH:  No results for input(s): TSH in the last 2160 hours.  A1C:  No results for input(s): HGBA1C in the last 2160 hours.    Radiology:        Assessment:     Matilde Redding is a 47 y.o.female with:    Stress at home  -     buPROPion (WELLBUTRIN XL) 150 MG TB24 tablet; Take 1 tablet (150 mg total) by mouth once daily.  Dispense: 30 tablet; Refill: 0    Stress at work  -     buPROPion (WELLBUTRIN XL) 150 MG TB24 tablet; Take 1 tablet (150 mg total) by mouth once daily.  Dispense: 30 tablet; Refill: 0    Loss of appetite  -     CBC Auto Differential; Future; Expected date: 04/14/2022  -     Comprehensive Metabolic Panel; Future; Expected date: 04/14/2022  -     TSH; Future; Expected date: 04/14/2022          Plan:     Problem List Items Addressed This Visit        Psychiatric    Stress at work    Overview     See below              Other    Stress at home - Primary    Overview     Start welbutrin 150 mg po qam  rtc in 1m            Loss of appetite    Overview     Check cmp , tsh , see above                 As above, continue current medications and maintain follow up with specialists.  Return to clinic in 1 months.      Frederick W Dantagnan [facility] [place]

## 2022-05-12 ENCOUNTER — OFFICE VISIT (OUTPATIENT)
Dept: INTERNAL MEDICINE | Facility: CLINIC | Age: 47
End: 2022-05-12
Payer: COMMERCIAL

## 2022-05-12 VITALS
TEMPERATURE: 99 F | DIASTOLIC BLOOD PRESSURE: 80 MMHG | SYSTOLIC BLOOD PRESSURE: 130 MMHG | RESPIRATION RATE: 18 BRPM | OXYGEN SATURATION: 99 % | HEART RATE: 72 BPM | WEIGHT: 243.25 LBS | HEIGHT: 72 IN | BODY MASS INDEX: 32.95 KG/M2

## 2022-05-12 DIAGNOSIS — F43.9 STRESS AT HOME: ICD-10-CM

## 2022-05-12 DIAGNOSIS — Z56.6 STRESS AT WORK: Primary | ICD-10-CM

## 2022-05-12 PROCEDURE — 99999 PR PBB SHADOW E&M-EST. PATIENT-LVL IV: CPT | Mod: PBBFAC,,, | Performed by: INTERNAL MEDICINE

## 2022-05-12 PROCEDURE — 1160F RVW MEDS BY RX/DR IN RCRD: CPT | Mod: CPTII,S$GLB,, | Performed by: INTERNAL MEDICINE

## 2022-05-12 PROCEDURE — 99214 OFFICE O/P EST MOD 30 MIN: CPT | Mod: S$GLB,,, | Performed by: INTERNAL MEDICINE

## 2022-05-12 PROCEDURE — 1160F PR REVIEW ALL MEDS BY PRESCRIBER/CLIN PHARMACIST DOCUMENTED: ICD-10-PCS | Mod: CPTII,S$GLB,, | Performed by: INTERNAL MEDICINE

## 2022-05-12 PROCEDURE — 1159F PR MEDICATION LIST DOCUMENTED IN MEDICAL RECORD: ICD-10-PCS | Mod: CPTII,S$GLB,, | Performed by: INTERNAL MEDICINE

## 2022-05-12 PROCEDURE — 99999 PR PBB SHADOW E&M-EST. PATIENT-LVL IV: ICD-10-PCS | Mod: PBBFAC,,, | Performed by: INTERNAL MEDICINE

## 2022-05-12 PROCEDURE — 3079F DIAST BP 80-89 MM HG: CPT | Mod: CPTII,S$GLB,, | Performed by: INTERNAL MEDICINE

## 2022-05-12 PROCEDURE — 3079F PR MOST RECENT DIASTOLIC BLOOD PRESSURE 80-89 MM HG: ICD-10-PCS | Mod: CPTII,S$GLB,, | Performed by: INTERNAL MEDICINE

## 2022-05-12 PROCEDURE — 99214 PR OFFICE/OUTPT VISIT, EST, LEVL IV, 30-39 MIN: ICD-10-PCS | Mod: S$GLB,,, | Performed by: INTERNAL MEDICINE

## 2022-05-12 PROCEDURE — 3008F PR BODY MASS INDEX (BMI) DOCUMENTED: ICD-10-PCS | Mod: CPTII,S$GLB,, | Performed by: INTERNAL MEDICINE

## 2022-05-12 PROCEDURE — 3075F SYST BP GE 130 - 139MM HG: CPT | Mod: CPTII,S$GLB,, | Performed by: INTERNAL MEDICINE

## 2022-05-12 PROCEDURE — 3075F PR MOST RECENT SYSTOLIC BLOOD PRESS GE 130-139MM HG: ICD-10-PCS | Mod: CPTII,S$GLB,, | Performed by: INTERNAL MEDICINE

## 2022-05-12 PROCEDURE — 3008F BODY MASS INDEX DOCD: CPT | Mod: CPTII,S$GLB,, | Performed by: INTERNAL MEDICINE

## 2022-05-12 PROCEDURE — 4010F ACE/ARB THERAPY RXD/TAKEN: CPT | Mod: CPTII,S$GLB,, | Performed by: INTERNAL MEDICINE

## 2022-05-12 PROCEDURE — 1159F MED LIST DOCD IN RCRD: CPT | Mod: CPTII,S$GLB,, | Performed by: INTERNAL MEDICINE

## 2022-05-12 PROCEDURE — 4010F PR ACE/ARB THEARPY RXD/TAKEN: ICD-10-PCS | Mod: CPTII,S$GLB,, | Performed by: INTERNAL MEDICINE

## 2022-05-12 RX ORDER — BUPROPION HYDROCHLORIDE 300 MG/1
300 TABLET ORAL DAILY
Qty: 30 TABLET | Refills: 0 | Status: SHIPPED | OUTPATIENT
Start: 2022-05-12 | End: 2022-06-09 | Stop reason: SDUPTHER

## 2022-05-12 SDOH — SOCIAL DETERMINANTS OF HEALTH (SDOH): OTHER PHYSICAL AND MENTAL STRAIN RELATED TO WORK: Z56.6

## 2022-05-12 NOTE — PROGRESS NOTES
[facility] Note    Chief Complaint      Chief Complaint   Patient presents with    Follow-up     1M       History of Present Illness      Matilde Redding is a 47 y.o. female who presents today for   Chief Complaint   Patient presents with    Follow-up     1M   .  Patient comes to appointment here for f/u on initiating Wellbutrin for depression / stress at work . She is somewhat improved but is still having some ibs type issues and is still with decreased appetite.     Problem List Items Addressed This Visit        Psychiatric    Stress at work - Primary    Overview     See below              Other    Stress at home    Overview     Started welbutrin 150 mg po is feeling a little better need to increase to 300 mg                    Past Medical History:  History reviewed. No pertinent past medical history.    Past Surgical History:  Past Surgical History:   Procedure Laterality Date    HYSTERECTOMY  6/2017    Ovary removed and fallopian tubes removed    PARTIAL HYSTERECTOMY  2017    WISDOM TOOTH EXTRACTION         Family History:  family history includes Cancer in her father; Heart disease in her father; Hypertension in her father and mother.    Social History:  Social History     Socioeconomic History    Marital status:    Tobacco Use    Smoking status: Never Smoker    Smokeless tobacco: Never Used   Substance and Sexual Activity    Alcohol use: Yes     Alcohol/week: 2.0 standard drinks     Types: 1 Glasses of wine, 1 Cans of beer per week    Drug use: Never    Sexual activity: Not Currently     Partners: Male     Birth control/protection: Other-see comments     Comment: Birth control pill     Social Determinants of Health     Financial Resource Strain: Low Risk     Difficulty of Paying Living Expenses: Not hard at all   Food Insecurity: No Food Insecurity    Worried About Running Out of Food in the Last Year: Never true    Ran Out of Food in the Last Year: Never true    Transportation Needs: No Transportation Needs    Lack of Transportation (Medical): No    Lack of Transportation (Non-Medical): No   Physical Activity: Inactive    Days of Exercise per Week: 0 days    Minutes of Exercise per Session: 0 min   Stress: Stress Concern Present    Feeling of Stress : Very much   Social Connections: Unknown    Frequency of Communication with Friends and Family: Three times a week    Frequency of Social Gatherings with Friends and Family: Never    Active Member of Clubs or Organizations: No    Attends Club or Organization Meetings: Never    Marital Status:    Housing Stability: Low Risk     Unable to Pay for Housing in the Last Year: No    Number of Places Lived in the Last Year: 1    Unstable Housing in the Last Year: No       Review of Systems:   Review of Systems   Constitutional: Negative for chills and fever.   HENT: Negative for congestion and nosebleeds.    Respiratory: Negative for cough.    Cardiovascular: Negative for chest pain and palpitations.   Gastrointestinal: Positive for diarrhea and heartburn.   Musculoskeletal: Negative for joint pain and myalgias.   Neurological: Negative for dizziness and headaches.   Psychiatric/Behavioral: Positive for depression. The patient is nervous/anxious.          Medications:  Outpatient Encounter Medications as of 5/12/2022   Medication Sig Dispense Refill    buPROPion (WELLBUTRIN XL) 150 MG TB24 tablet Take 1 tablet (150 mg total) by mouth once daily. 30 tablet 0    cetirizine (ZYRTEC) 10 MG tablet Take 10 mg by mouth once daily.      lisinopriL 10 MG tablet Take 1 tablet (10 mg total) by mouth once daily. 30 tablet 5    norethin-e.estradiol triphasic (ARANELLE, 28,) 0.5/1/0.5-35 mg-mcg Tab       omeprazole (PRILOSEC) 20 MG capsule TAKE 1 CAPSULE BY MOUTH 30 MINUTES BEFORE DINNER       No facility-administered encounter medications on file as of 5/12/2022.        Allergies:  Review of patient's allergies  indicates:  No Known Allergies      Physical Exam         Vitals:    05/12/22 0954   BP: 130/80   Pulse: 72   Resp: 18   Temp: 98.6 °F (37 °C)         Physical Exam  Constitutional:       Appearance: She is well-developed.   Eyes:      Pupils: Pupils are equal, round, and reactive to light.   Neck:      Thyroid: No thyromegaly.   Cardiovascular:      Rate and Rhythm: Normal rate.      Heart sounds: Normal heart sounds. No murmur heard.    No friction rub. No gallop.   Pulmonary:      Breath sounds: Normal breath sounds.   Abdominal:      General: Bowel sounds are normal.      Palpations: Abdomen is soft.   Musculoskeletal:         General: Normal range of motion.      Cervical back: Normal range of motion.   Lymphadenopathy:      Cervical: No cervical adenopathy.   Skin:     General: Skin is warm.      Findings: No rash.   Neurological:      Mental Status: She is alert and oriented to person, place, and time.      Cranial Nerves: No cranial nerve deficit.   Psychiatric:         Behavior: Behavior normal.          Laboratory:  CBC:  Recent Labs   Lab Result Units 04/14/22  1136   WBC K/uL 8.98   RBC M/uL 4.83   Hemoglobin g/dL 13.2   Hematocrit % 41.1   Platelets K/uL 384   MCV fL 85   MCH pg 27.3   MCHC g/dL 32.1     CMP:  Recent Labs   Lab Result Units 04/14/22  1136   Glucose mg/dL 101   Calcium mg/dL 9.8   Albumin g/dL 3.6   Total Protein g/dL 7.4   Sodium mmol/L 137   Potassium mmol/L 3.9   CO2 mmol/L 25   Chloride mmol/L 99   BUN mg/dL 9   Alkaline Phosphatase U/L 85   ALT U/L 36   AST U/L 26   Total Bilirubin mg/dL 0.7     URINALYSIS:  No results for input(s): COLORU, CLARITYU, SPECGRAV, PHUR, PROTEINUA, GLUCOSEU, BILIRUBINCON, BLOODU, WBCU, RBCU, BACTERIA, MUCUS, NITRITE, LEUKOCYTESUR, UROBILINOGEN, HYALINECASTS in the last 2160 hours.   LIPIDS:  Recent Labs   Lab Result Units 04/14/22  1136   TSH uIU/mL 1.192     TSH:  Recent Labs   Lab Result Units 04/14/22  1136   TSH uIU/mL 1.192     A1C:  No results for  input(s): HGBA1C in the last 2160 hours.    Radiology:        Assessment:     Matilde Redding is a 47 y.o.female with:    Stress at work    Stress at home          Plan:     Problem List Items Addressed This Visit        Psychiatric    Stress at work - Primary    Overview     See below              Other    Stress at home    Overview     Started welbutrin 150 mg po is feeling a little better need to increase to 300 mg                  As above, continue current medications and maintain follow up with specialists.  Return to clinic in 1  months.      Frederick W Dantagnan [facility] [place]

## 2022-05-23 ENCOUNTER — TELEPHONE (OUTPATIENT)
Dept: INTERNAL MEDICINE | Facility: CLINIC | Age: 47
End: 2022-05-23
Payer: COMMERCIAL

## 2022-05-23 RX ORDER — PANTOPRAZOLE SODIUM 40 MG/1
40 TABLET, DELAYED RELEASE ORAL DAILY
Qty: 30 TABLET | Refills: 5 | Status: SHIPPED | OUTPATIENT
Start: 2022-05-23 | End: 2023-01-12

## 2022-05-23 NOTE — TELEPHONE ENCOUNTER
----- Message from Park Suh sent at 5/23/2022  3:17 PM CDT -----  Contact: [phone]  Pt missed a call from the office and would like a call back.

## 2022-05-23 NOTE — TELEPHONE ENCOUNTER
States she thinks her coughing was from acid reflux, started taking omeprazole 2 po before dinner. Coughs so much she starts to gag.

## 2022-05-23 NOTE — TELEPHONE ENCOUNTER
----- Message from Park Suh sent at 5/23/2022  3:48 PM CDT -----  Contact: [phone]  Pt missed a call from the office she didn't hear her phone ring. Pt wants a call back please.

## 2022-05-23 NOTE — TELEPHONE ENCOUNTER
----- Message from Evelyne Sosa sent at 5/23/2022  9:06 AM CDT -----  Regarding: call back  Contact: [phone]  Who Called: PT     Patient called in requesting to speak with you. Did not specify why just said medication given to her for her cough is not helping. Please advise.

## 2022-05-26 ENCOUNTER — PATIENT MESSAGE (OUTPATIENT)
Dept: INTERNAL MEDICINE | Facility: CLINIC | Age: 47
End: 2022-05-26
Payer: COMMERCIAL

## 2022-05-31 ENCOUNTER — PATIENT MESSAGE (OUTPATIENT)
Dept: INTERNAL MEDICINE | Facility: CLINIC | Age: 47
End: 2022-05-31
Payer: COMMERCIAL

## 2022-05-31 DIAGNOSIS — R05.3 CHRONIC COUGH: Primary | ICD-10-CM

## 2022-06-02 ENCOUNTER — PATIENT MESSAGE (OUTPATIENT)
Dept: INTERNAL MEDICINE | Facility: CLINIC | Age: 47
End: 2022-06-02
Payer: COMMERCIAL

## 2022-06-02 ENCOUNTER — HOSPITAL ENCOUNTER (OUTPATIENT)
Dept: RADIOLOGY | Facility: CLINIC | Age: 47
Discharge: HOME | End: 2022-06-02
Attending: INTERNAL MEDICINE
Payer: COMMERCIAL

## 2022-06-02 DIAGNOSIS — R05.3 CHRONIC COUGH: ICD-10-CM

## 2022-06-02 DIAGNOSIS — R05.3 CHRONIC COUGH: Primary | ICD-10-CM

## 2022-06-02 PROCEDURE — 71046 XR CHEST PA AND LATERAL: ICD-10-PCS | Mod: 26,,, | Performed by: RADIOLOGY

## 2022-06-02 PROCEDURE — 71046 X-RAY EXAM CHEST 2 VIEWS: CPT | Mod: TC,FY,PO

## 2022-06-02 PROCEDURE — 71046 X-RAY EXAM CHEST 2 VIEWS: CPT | Mod: 26,,, | Performed by: RADIOLOGY

## 2022-06-02 NOTE — TELEPHONE ENCOUNTER
Spoke with pt, explained that we do not have results that quick. She finished at 8:16 and sent us message at 8:17    Explained about Xray order and insurance.

## 2022-06-09 ENCOUNTER — TELEPHONE (OUTPATIENT)
Dept: INTERNAL MEDICINE | Facility: CLINIC | Age: 47
End: 2022-06-09
Payer: COMMERCIAL

## 2022-06-09 ENCOUNTER — OFFICE VISIT (OUTPATIENT)
Dept: INTERNAL MEDICINE | Facility: CLINIC | Age: 47
End: 2022-06-09
Payer: COMMERCIAL

## 2022-06-09 VITALS
HEIGHT: 72 IN | RESPIRATION RATE: 18 BRPM | OXYGEN SATURATION: 98 % | SYSTOLIC BLOOD PRESSURE: 138 MMHG | TEMPERATURE: 99 F | DIASTOLIC BLOOD PRESSURE: 82 MMHG | BODY MASS INDEX: 32.32 KG/M2 | WEIGHT: 238.63 LBS | HEART RATE: 83 BPM

## 2022-06-09 DIAGNOSIS — Z56.6 STRESS AT WORK: ICD-10-CM

## 2022-06-09 DIAGNOSIS — F43.9 STRESS AT HOME: ICD-10-CM

## 2022-06-09 DIAGNOSIS — I10 BENIGN ESSENTIAL HYPERTENSION: ICD-10-CM

## 2022-06-09 PROCEDURE — 99999 PR PBB SHADOW E&M-EST. PATIENT-LVL III: CPT | Mod: PBBFAC,,, | Performed by: INTERNAL MEDICINE

## 2022-06-09 PROCEDURE — 3079F PR MOST RECENT DIASTOLIC BLOOD PRESSURE 80-89 MM HG: ICD-10-PCS | Mod: CPTII,S$GLB,, | Performed by: INTERNAL MEDICINE

## 2022-06-09 PROCEDURE — 3075F PR MOST RECENT SYSTOLIC BLOOD PRESS GE 130-139MM HG: ICD-10-PCS | Mod: CPTII,S$GLB,, | Performed by: INTERNAL MEDICINE

## 2022-06-09 PROCEDURE — 4010F ACE/ARB THERAPY RXD/TAKEN: CPT | Mod: CPTII,S$GLB,, | Performed by: INTERNAL MEDICINE

## 2022-06-09 PROCEDURE — 1160F RVW MEDS BY RX/DR IN RCRD: CPT | Mod: CPTII,S$GLB,, | Performed by: INTERNAL MEDICINE

## 2022-06-09 PROCEDURE — 99214 PR OFFICE/OUTPT VISIT, EST, LEVL IV, 30-39 MIN: ICD-10-PCS | Mod: S$GLB,,, | Performed by: INTERNAL MEDICINE

## 2022-06-09 PROCEDURE — 1159F MED LIST DOCD IN RCRD: CPT | Mod: CPTII,S$GLB,, | Performed by: INTERNAL MEDICINE

## 2022-06-09 PROCEDURE — 1160F PR REVIEW ALL MEDS BY PRESCRIBER/CLIN PHARMACIST DOCUMENTED: ICD-10-PCS | Mod: CPTII,S$GLB,, | Performed by: INTERNAL MEDICINE

## 2022-06-09 PROCEDURE — 3008F BODY MASS INDEX DOCD: CPT | Mod: CPTII,S$GLB,, | Performed by: INTERNAL MEDICINE

## 2022-06-09 PROCEDURE — 3079F DIAST BP 80-89 MM HG: CPT | Mod: CPTII,S$GLB,, | Performed by: INTERNAL MEDICINE

## 2022-06-09 PROCEDURE — 99999 PR PBB SHADOW E&M-EST. PATIENT-LVL III: ICD-10-PCS | Mod: PBBFAC,,, | Performed by: INTERNAL MEDICINE

## 2022-06-09 PROCEDURE — 3075F SYST BP GE 130 - 139MM HG: CPT | Mod: CPTII,S$GLB,, | Performed by: INTERNAL MEDICINE

## 2022-06-09 PROCEDURE — 99214 OFFICE O/P EST MOD 30 MIN: CPT | Mod: S$GLB,,, | Performed by: INTERNAL MEDICINE

## 2022-06-09 PROCEDURE — 4010F PR ACE/ARB THEARPY RXD/TAKEN: ICD-10-PCS | Mod: CPTII,S$GLB,, | Performed by: INTERNAL MEDICINE

## 2022-06-09 PROCEDURE — 3008F PR BODY MASS INDEX (BMI) DOCUMENTED: ICD-10-PCS | Mod: CPTII,S$GLB,, | Performed by: INTERNAL MEDICINE

## 2022-06-09 PROCEDURE — 1159F PR MEDICATION LIST DOCUMENTED IN MEDICAL RECORD: ICD-10-PCS | Mod: CPTII,S$GLB,, | Performed by: INTERNAL MEDICINE

## 2022-06-09 RX ORDER — LOSARTAN POTASSIUM 100 MG/1
100 TABLET ORAL DAILY
Qty: 90 TABLET | Refills: 3 | Status: SHIPPED | OUTPATIENT
Start: 2022-06-09 | End: 2022-06-16

## 2022-06-09 RX ORDER — BUPROPION HYDROCHLORIDE 300 MG/1
300 TABLET ORAL DAILY
Qty: 30 TABLET | Refills: 3 | Status: SHIPPED | OUTPATIENT
Start: 2022-06-09 | End: 2022-10-05 | Stop reason: SDUPTHER

## 2022-06-09 SDOH — SOCIAL DETERMINANTS OF HEALTH (SDOH): OTHER PHYSICAL AND MENTAL STRAIN RELATED TO WORK: Z56.6

## 2022-06-09 NOTE — PROGRESS NOTES
[facility] Note    Chief Complaint      Chief Complaint   Patient presents with    Follow-up     1m follow up        History of Present Illness      Matilde Redding is a 47 y.o. female who presents today for   Chief Complaint   Patient presents with    Follow-up     1m follow up    .  Patient comes to appointment here for 1m f/u on chronic cough and work relatead stress . Is on Wellbutrin 300 mg now and is doing [facility] better . Her chronic cough is improving after stopping her lisinopril .    Problem List Items Addressed This Visit        Psychiatric    Stress at work    Overview     See below              Cardiac/Vascular    Benign essential hypertension    Overview     Stopped lisinopril due to chronic cough . Will change to losartan 100 mg               Other    Stress at home    Overview     Cont welbutrin 300 mg   rtc in 4 m                   Past Medical History:  History reviewed. No pertinent past medical history.    Past Surgical History:  Past Surgical History:   Procedure Laterality Date    HYSTERECTOMY  6/2017    Ovary removed and fallopian tubes removed    PARTIAL HYSTERECTOMY  2017    WISDOM TOOTH EXTRACTION         Family History:  family history includes Cancer in her father; Heart disease in her father; Hypertension in her father and mother.    Social History:  Social History     Socioeconomic History    Marital status:    Tobacco Use    Smoking status: Never Smoker    Smokeless tobacco: Never Used   Substance and Sexual Activity    Alcohol use: Yes     Alcohol/week: 2.0 standard drinks     Types: 1 Glasses of wine, 1 Cans of beer per week    Drug use: Never    Sexual activity: Not Currently     Partners: Male     Birth control/protection: Other-see comments     Comment: Birth control pill     Social Determinants of Health     Financial Resource Strain: Low Risk     Difficulty of Paying Living Expenses: Not hard at all   Food Insecurity: No Food  Insecurity    Worried About Running Out of Food in the Last Year: Never true    Ran Out of Food in the Last Year: Never true   Transportation Needs: No Transportation Needs    Lack of Transportation (Medical): No    Lack of Transportation (Non-Medical): No   Physical Activity: Insufficiently Active    Days of Exercise per Week: 1 day    Minutes of Exercise per Session: 60 min   Stress: Stress Concern Present    Feeling of Stress : To some extent   Social Connections: Unknown    Frequency of Communication with Friends and Family: Once a week    Frequency of Social Gatherings with Friends and Family: Never    Active Member of Clubs or Organizations: No    Attends Club or Organization Meetings: Never    Marital Status:    Housing Stability: Low Risk     Unable to Pay for Housing in the Last Year: No    Number of Places Lived in the Last Year: 1    Unstable Housing in the Last Year: No       Review of Systems:   Review of Systems   Constitutional: Negative for fever and weight loss.   HENT: Negative for congestion, hearing loss and sore throat.    Eyes: Negative for blurred vision.   Respiratory: Positive for cough. Negative for shortness of breath.    Cardiovascular: Negative for chest pain, palpitations, claudication and leg swelling.   Gastrointestinal: Positive for heartburn. Negative for abdominal pain, constipation and diarrhea.   Genitourinary: Negative for dysuria.   Musculoskeletal: Negative for back pain and myalgias.   Skin: Negative for rash.   Neurological: Negative for focal weakness and headaches.   Psychiatric/Behavioral: Positive for depression. Negative for suicidal ideas. The patient is not nervous/anxious.          Medications:  Outpatient Encounter Medications as of 6/9/2022   Medication Sig Dispense Refill    cetirizine (ZYRTEC) 10 MG tablet Take 10 mg by mouth once daily.      norethin-e.estradiol triphasic (ARANELLE, Cierra,) 0.5/1/0.5-35 mg-mcg Tab       pantoprazole  (PROTONIX) 40 MG tablet Take 1 tablet (40 mg total) by mouth once daily. 30 tablet 5    [DISCONTINUED] buPROPion (WELLBUTRIN XL) 300 MG 24 hr tablet Take 1 tablet (300 mg total) by mouth once daily. 30 tablet 0    buPROPion (WELLBUTRIN XL) 300 MG 24 hr tablet Take 1 tablet (300 mg total) by mouth once daily. 30 tablet 3    losartan (COZAAR) 100 MG tablet Take 1 tablet (100 mg total) by mouth once daily. 90 tablet 3    [DISCONTINUED] lisinopriL 10 MG tablet Take 1 tablet (10 mg total) by mouth once daily. (Patient not taking: Reported on 6/9/2022) 30 tablet 5     No facility-administered encounter medications on file as of 6/9/2022.        Allergies:  Review of patient's allergies indicates:  No Known Allergies      Physical Exam         Vitals:    06/09/22 1136   BP: 138/82   Pulse: 83   Resp: 18   Temp: 98.9 °F (37.2 °C)         Physical Exam  Constitutional:       Appearance: She is well-developed.   Eyes:      Pupils: Pupils are equal, round, and reactive to light.   Neck:      Thyroid: No thyromegaly.   Cardiovascular:      Rate and Rhythm: Normal rate.      Heart sounds: Normal heart sounds. No murmur heard.    No friction rub. No gallop.   Pulmonary:      Breath sounds: Normal breath sounds.   Abdominal:      General: Bowel sounds are normal.      Palpations: Abdomen is soft.   Musculoskeletal:         General: Normal range of motion.      Cervical back: Normal range of motion.   Lymphadenopathy:      Cervical: No cervical adenopathy.   Skin:     General: Skin is warm.      Findings: No rash.   Neurological:      Mental Status: She is alert and oriented to person, place, and time.      Cranial Nerves: No cranial nerve deficit.   Psychiatric:         Behavior: Behavior normal.          Laboratory:  CBC:  Recent Labs   Lab Result Units 04/14/22  1136   WBC K/uL 8.98   RBC M/uL 4.83   Hemoglobin g/dL 13.2   Hematocrit % 41.1   Platelets K/uL 384   MCV fL 85   MCH pg 27.3   MCHC g/dL 32.1     CMP:  Recent Labs    Lab Result Units 04/14/22  1136   Glucose mg/dL 101   Calcium mg/dL 9.8   Albumin g/dL 3.6   Total Protein g/dL 7.4   Sodium mmol/L 137   Potassium mmol/L 3.9   CO2 mmol/L 25   Chloride mmol/L 99   BUN mg/dL 9   Alkaline Phosphatase U/L 85   ALT U/L 36   AST U/L 26   Total Bilirubin mg/dL 0.7     URINALYSIS:  No results for input(s): COLORU, CLARITYU, SPECGRAV, PHUR, PROTEINUA, GLUCOSEU, BILIRUBINCON, BLOODU, WBCU, RBCU, BACTERIA, MUCUS, NITRITE, LEUKOCYTESUR, UROBILINOGEN, HYALINECASTS in the last 2160 hours.   LIPIDS:  Recent Labs   Lab Result Units 04/14/22  1136   TSH uIU/mL 1.192     TSH:  Recent Labs   Lab Result Units 04/14/22  1136   TSH uIU/mL 1.192     A1C:  No results for input(s): HGBA1C in the last 2160 hours.    Radiology:        Assessment:     Matilde Redding is a 47 y.o.female with:    Benign essential hypertension  -     losartan (COZAAR) 100 MG tablet; Take 1 tablet (100 mg total) by mouth once daily.  Dispense: 90 tablet; Refill: 3    Stress at home  -     buPROPion (WELLBUTRIN XL) 300 MG 24 hr tablet; Take 1 tablet (300 mg total) by mouth once daily.  Dispense: 30 tablet; Refill: 3    Stress at work  -     buPROPion (WELLBUTRIN XL) 300 MG 24 hr tablet; Take 1 tablet (300 mg total) by mouth once daily.  Dispense: 30 tablet; Refill: 3          Plan:     Problem List Items Addressed This Visit        Psychiatric    Stress at work    Overview     See below              Cardiac/Vascular    Benign essential hypertension    Overview     Stopped lisinopril due to chronic cough . Will change to losartan 100 mg               Other    Stress at home    Overview     Cont welbutrin 300 mg   rtc in 4 m                 As above, continue current medications and maintain follow up with specialists.  Return to clinic in 4 months.      Frederick W Dantagnan [facility] [place]

## 2022-06-09 NOTE — TELEPHONE ENCOUNTER
Pt states she is on her way, but running late. Pt was made aware that is she is after 11:45, she will need to r/s

## 2022-06-09 NOTE — TELEPHONE ENCOUNTER
----- Message from Joe Rodriguez sent at 6/9/2022 10:33 AM CDT -----  Contact: patient/  [phone]  Patient calling to speak with you regarding rescheduling her appointment to a later time today   Please advise

## 2022-06-11 ENCOUNTER — PATIENT MESSAGE (OUTPATIENT)
Dept: INTERNAL MEDICINE | Facility: CLINIC | Age: 47
End: 2022-06-11
Payer: COMMERCIAL

## 2022-06-16 ENCOUNTER — PATIENT MESSAGE (OUTPATIENT)
Dept: INTERNAL MEDICINE | Facility: CLINIC | Age: 47
End: 2022-06-16
Payer: COMMERCIAL

## 2022-06-16 RX ORDER — AMLODIPINE BESYLATE 5 MG/1
5 TABLET ORAL DAILY
Qty: 30 TABLET | Refills: 11 | Status: SHIPPED | OUTPATIENT
Start: 2022-06-16 | End: 2023-03-03 | Stop reason: SDUPTHER

## 2022-06-16 NOTE — TELEPHONE ENCOUNTER
No new care gaps identified.  [facility] Embedded Care Gaps. Reference number: [id number]. 6/16/2022   10:12:52 AM KRISTANT

## 2022-10-05 ENCOUNTER — PATIENT MESSAGE (OUTPATIENT)
Dept: INTERNAL MEDICINE | Facility: CLINIC | Age: 47
End: 2022-10-05
Payer: COMMERCIAL

## 2022-10-05 DIAGNOSIS — Z56.6 STRESS AT WORK: ICD-10-CM

## 2022-10-05 DIAGNOSIS — F43.9 STRESS AT HOME: ICD-10-CM

## 2022-10-05 RX ORDER — BUPROPION HYDROCHLORIDE 300 MG/1
300 TABLET ORAL DAILY
Qty: 30 TABLET | Refills: 3 | OUTPATIENT
Start: 2022-10-05 | End: 2023-10-05

## 2022-10-05 RX ORDER — BUPROPION HYDROCHLORIDE 300 MG/1
300 TABLET ORAL DAILY
Qty: 30 TABLET | Refills: 5 | Status: SHIPPED | OUTPATIENT
Start: 2022-10-05 | End: 2023-03-03 | Stop reason: SDUPTHER

## 2022-10-05 SDOH — SOCIAL DETERMINANTS OF HEALTH (SDOH): OTHER PHYSICAL AND MENTAL STRAIN RELATED TO WORK: Z56.6

## 2022-10-05 NOTE — TELEPHONE ENCOUNTER
No new care gaps identified.  [facility] Embedded Care Gaps. Reference number: [id number]. 10/05/2022   7:56:46 AM KRISTANT

## 2022-10-05 NOTE — TELEPHONE ENCOUNTER
No new care gaps identified.  [facility] Embedded Care Gaps. Reference number: [id number]. 10/05/2022   5:51:39 AM KRISTANT

## 2022-10-11 ENCOUNTER — OFFICE VISIT (OUTPATIENT)
Dept: INTERNAL MEDICINE | Facility: CLINIC | Age: 47
End: 2022-10-11
Payer: COMMERCIAL

## 2022-10-11 VITALS
RESPIRATION RATE: 18 BRPM | HEART RATE: 84 BPM | HEIGHT: 72 IN | OXYGEN SATURATION: 98 % | BODY MASS INDEX: 32.61 KG/M2 | WEIGHT: 240.75 LBS | SYSTOLIC BLOOD PRESSURE: 124 MMHG | TEMPERATURE: 98 F | DIASTOLIC BLOOD PRESSURE: 80 MMHG

## 2022-10-11 DIAGNOSIS — Z56.6 STRESS AT WORK: Primary | ICD-10-CM

## 2022-10-11 DIAGNOSIS — I10 BENIGN ESSENTIAL HYPERTENSION: ICD-10-CM

## 2022-10-11 DIAGNOSIS — F43.9 STRESS AT HOME: ICD-10-CM

## 2022-10-11 PROCEDURE — 99999 PR PBB SHADOW E&M-EST. PATIENT-LVL IV: CPT | Mod: PBBFAC,,, | Performed by: INTERNAL MEDICINE

## 2022-10-11 PROCEDURE — 3079F DIAST BP 80-89 MM HG: CPT | Mod: CPTII,S$GLB,, | Performed by: INTERNAL MEDICINE

## 2022-10-11 PROCEDURE — 3079F PR MOST RECENT DIASTOLIC BLOOD PRESSURE 80-89 MM HG: ICD-10-PCS | Mod: CPTII,S$GLB,, | Performed by: INTERNAL MEDICINE

## 2022-10-11 PROCEDURE — 99214 OFFICE O/P EST MOD 30 MIN: CPT | Mod: S$GLB,,, | Performed by: INTERNAL MEDICINE

## 2022-10-11 PROCEDURE — 1159F MED LIST DOCD IN RCRD: CPT | Mod: CPTII,S$GLB,, | Performed by: INTERNAL MEDICINE

## 2022-10-11 PROCEDURE — 3008F PR BODY MASS INDEX (BMI) DOCUMENTED: ICD-10-PCS | Mod: CPTII,S$GLB,, | Performed by: INTERNAL MEDICINE

## 2022-10-11 PROCEDURE — 1160F RVW MEDS BY RX/DR IN RCRD: CPT | Mod: CPTII,S$GLB,, | Performed by: INTERNAL MEDICINE

## 2022-10-11 PROCEDURE — 1160F PR REVIEW ALL MEDS BY PRESCRIBER/CLIN PHARMACIST DOCUMENTED: ICD-10-PCS | Mod: CPTII,S$GLB,, | Performed by: INTERNAL MEDICINE

## 2022-10-11 PROCEDURE — 4010F PR ACE/ARB THEARPY RXD/TAKEN: ICD-10-PCS | Mod: CPTII,S$GLB,, | Performed by: INTERNAL MEDICINE

## 2022-10-11 PROCEDURE — 3074F PR MOST RECENT SYSTOLIC BLOOD PRESSURE < 130 MM HG: ICD-10-PCS | Mod: CPTII,S$GLB,, | Performed by: INTERNAL MEDICINE

## 2022-10-11 PROCEDURE — 3008F BODY MASS INDEX DOCD: CPT | Mod: CPTII,S$GLB,, | Performed by: INTERNAL MEDICINE

## 2022-10-11 PROCEDURE — 1159F PR MEDICATION LIST DOCUMENTED IN MEDICAL RECORD: ICD-10-PCS | Mod: CPTII,S$GLB,, | Performed by: INTERNAL MEDICINE

## 2022-10-11 PROCEDURE — 4010F ACE/ARB THERAPY RXD/TAKEN: CPT | Mod: CPTII,S$GLB,, | Performed by: INTERNAL MEDICINE

## 2022-10-11 PROCEDURE — 99214 PR OFFICE/OUTPT VISIT, EST, LEVL IV, 30-39 MIN: ICD-10-PCS | Mod: S$GLB,,, | Performed by: INTERNAL MEDICINE

## 2022-10-11 PROCEDURE — 99999 PR PBB SHADOW E&M-EST. PATIENT-LVL IV: ICD-10-PCS | Mod: PBBFAC,,, | Performed by: INTERNAL MEDICINE

## 2022-10-11 PROCEDURE — 3074F SYST BP LT 130 MM HG: CPT | Mod: CPTII,S$GLB,, | Performed by: INTERNAL MEDICINE

## 2022-10-11 SDOH — SOCIAL DETERMINANTS OF HEALTH (SDOH): OTHER PHYSICAL AND MENTAL STRAIN RELATED TO WORK: Z56.6

## 2022-10-11 NOTE — PROGRESS NOTES
[facility] Note    Chief Complaint      Chief Complaint   Patient presents with    Follow-up     4m        History of Present Illness      Matilde Redding is a 47 y.o. female who presents today for   Chief Complaint   Patient presents with    Follow-up     4m    .  Patient comes to appointment here for 4m checkup for chronic issues as below . She is doing great on her current dose of welbutrin . She has received covid vaccine x 3 . She declines flu vaccine . Labs due in January at  next visit     Problem List Items Addressed This Visit          Psychiatric    Stress at work - Primary    Overview     See below            Cardiac/Vascular    Benign essential hypertension    Overview     Now on amlodipine blood pressure is well controlled             Other    Stress at home    Overview     Cont welbutrin 300 mg is working well is having some occassional breakthrough               Past Medical History:  History reviewed. No pertinent past medical history.    Past Surgical History:  Past Surgical History:   Procedure Laterality Date    HYSTERECTOMY  6/2017    Ovary removed and fallopian tubes removed    PARTIAL HYSTERECTOMY  2017    WISDOM TOOTH EXTRACTION         Family History:  family history includes Cancer in her father; Heart disease in her father; Hypertension in her father and mother.    Social History:  Social History     Socioeconomic History    Marital status:    Tobacco Use    Smoking status: Never    Smokeless tobacco: Never   Substance and Sexual Activity    Alcohol use: Yes     Alcohol/week: 2.0 standard drinks     Types: 1 Glasses of wine, 1 Cans of beer per week    Drug use: Never    Sexual activity: Not Currently     Partners: Male     Birth control/protection: Other-see comments     Comment: Birth control pill     Social Determinants of Health     Financial Resource Strain: Low Risk     Difficulty of Paying Living Expenses: Not hard at all   Food Insecurity: No Food  Insecurity    Worried About Running Out of Food in the Last Year: Never true    Ran Out of Food in the Last Year: Never true   Transportation Needs: No Transportation Needs    Lack of Transportation (Medical): No    Lack of Transportation (Non-Medical): No   Physical Activity: Insufficiently Active    Days of Exercise per Week: 1 day    Minutes of Exercise per Session: 60 min   Stress: Stress Concern Present    Feeling of Stress : To some extent   Social Connections: Unknown    Frequency of Communication with Friends and Family: Once a week    Frequency of Social Gatherings with Friends and Family: Never    Active Member of Clubs or Organizations: No    Attends Club or Organization Meetings: Never    Marital Status:    Housing Stability: Low Risk     Unable to Pay for Housing in the Last Year: No    Number of Places Lived in the Last Year: 1    Unstable Housing in the Last Year: No       Review of Systems:   Review of Systems   Constitutional:  Negative for fever and weight loss.   HENT:  Negative for congestion, hearing loss and sore throat.    Eyes:  Negative for blurred vision.   Respiratory:  Negative for cough and shortness of breath.    Cardiovascular:  Negative for chest pain, palpitations, claudication and leg swelling.   Gastrointestinal:  Negative for abdominal pain, constipation, diarrhea and heartburn.   Genitourinary:  Negative for dysuria.   Musculoskeletal:  Negative for back pain and myalgias.   Skin:  Negative for rash.   Neurological:  Negative for focal weakness and headaches.   Psychiatric/Behavioral:  Negative for depression, memory loss and suicidal ideas. The patient is not nervous/anxious.        Medications:  Outpatient Encounter Medications as of 10/11/2022   Medication Sig Dispense Refill    amLODIPine (NORVASC) 5 MG tablet Take 1 tablet (5 mg total) by mouth once daily. 30 tablet 11    buPROPion (WELLBUTRIN XL) 300 MG 24 hr tablet Take 1 tablet (300 mg total) by mouth once daily.  30 tablet 5    cetirizine (ZYRTEC) 10 MG tablet Take 10 mg by mouth once daily.      norethin-e.estradiol triphasic (ARANELLE, 28,) 0.5/1/0.5-35 mg-mcg Tab       pantoprazole (PROTONIX) 40 MG tablet Take 1 tablet (40 mg total) by mouth once daily. 30 tablet 5     No facility-administered encounter medications on file as of 10/11/2022.        Allergies:  Review of patient's allergies indicates:  No Known Allergies      Physical Exam         Vitals:    10/11/22 0850   BP: 124/80   Pulse: 84   Resp: 18   Temp: 98 °F (36.7 °C)         Physical Exam  Constitutional:       Appearance: She is well-developed.   Eyes:      Pupils: Pupils are equal, round, and reactive to light.   Neck:      Thyroid: No thyromegaly.   Cardiovascular:      Rate and Rhythm: Normal rate.      Heart sounds: Normal heart sounds. No murmur heard.    No friction rub. No gallop.   Pulmonary:      Breath sounds: Normal breath sounds.   Abdominal:      General: Bowel sounds are normal.      Palpations: Abdomen is soft.   Musculoskeletal:         General: Normal range of motion.      Cervical back: Normal range of motion.   Lymphadenopathy:      Cervical: No cervical adenopathy.   Skin:     General: Skin is warm.      Findings: No rash.   Neurological:      Mental Status: She is alert and oriented to person, place, and time.      Cranial Nerves: No cranial nerve deficit.   Psychiatric:         Behavior: Behavior normal.        Laboratory:  CBC:  No results for input(s): WBC, RBC, HGB, HCT, PLT, MCV, MCH, MCHC in the last 2160 hours.  CMP:  No results for input(s): GLU, CALCIUM, ALBUMIN, PROT, NA, K, CO2, CL, BUN, ALKPHOS, ALT, AST, BILITOT in the last 2160 hours.    Invalid input(s): CREATININ  URINALYSIS:  No results for input(s): COLORU, CLARITYU, SPECGRAV, PHUR, PROTEINUA, GLUCOSEU, BILIRUBINCON, BLOODU, WBCU, RBCU, BACTERIA, MUCUS, NITRITE, LEUKOCYTESUR, UROBILINOGEN, HYALINECASTS in the last 2160 hours.   LIPIDS:  No results for input(s): TSH, HDL,  CHOL, TRIG, LDLCALC, CHOLHDL, NONHDLCHOL, TOTALCHOLEST in the last 2160 hours.  TSH:  No results for input(s): TSH in the last 2160 hours.  A1C:  No results for input(s): HGBA1C in the last 2160 hours.    Radiology:        Assessment:     Matilde Redding is a 47 y.o.female with:    Stress at work    Benign essential hypertension    Stress at home        Plan:     Problem List Items Addressed This Visit          Psychiatric    Stress at work - Primary    Overview     See below            Cardiac/Vascular    Benign essential hypertension    Overview     Now on amlodipine blood pressure is well controlled             Other    Stress at home    Overview     Cont welbutrin 300 mg is working well is having some occassional breakthrough             As above, continue current medications and maintain follow up with specialists.  Return to clinic in 6 months.      Frederick W Dantagnan [facility] [place]

## 2022-12-09 DIAGNOSIS — Z12.31 OTHER SCREENING MAMMOGRAM: ICD-10-CM

## 2022-12-13 ENCOUNTER — PATIENT MESSAGE (OUTPATIENT)
Dept: ADMINISTRATIVE | Facility: HOSPITAL | Age: 47
End: 2022-12-13
Payer: COMMERCIAL

## 2023-01-02 ENCOUNTER — PATIENT MESSAGE (OUTPATIENT)
Dept: INTERNAL MEDICINE | Facility: CLINIC | Age: 48
End: 2023-01-02
Payer: COMMERCIAL

## 2023-01-02 DIAGNOSIS — Z00.00 ANNUAL PHYSICAL EXAM: Primary | ICD-10-CM

## 2023-01-02 DIAGNOSIS — I10 BENIGN ESSENTIAL HYPERTENSION: ICD-10-CM

## 2023-01-06 ENCOUNTER — LAB VISIT (OUTPATIENT)
Dept: LAB | Facility: HOSPITAL | Age: 48
End: 2023-01-06
Attending: INTERNAL MEDICINE
Payer: COMMERCIAL

## 2023-01-06 DIAGNOSIS — Z00.00 ANNUAL PHYSICAL EXAM: ICD-10-CM

## 2023-01-06 DIAGNOSIS — I10 BENIGN ESSENTIAL HYPERTENSION: ICD-10-CM

## 2023-01-06 LAB
ALBUMIN SERPL BCP-MCNC: 3.2 G/DL (ref 3.5–5.2)
ALP SERPL-CCNC: 79 U/L (ref 55–135)
ALT SERPL W/O P-5'-P-CCNC: 26 U/L (ref 10–44)
ANION GAP SERPL CALC-SCNC: 6 MMOL/L (ref 8–16)
AST SERPL-CCNC: 15 U/L (ref 10–40)
BASOPHILS # BLD AUTO: 0.04 K/UL (ref 0–0.2)
BASOPHILS NFR BLD: 0.6 % (ref 0–1.9)
BILIRUB SERPL-MCNC: 0.6 MG/DL (ref 0.1–1)
BUN SERPL-MCNC: 16 MG/DL (ref 6–20)
CALCIUM SERPL-MCNC: 8.4 MG/DL (ref 8.7–10.5)
CHLORIDE SERPL-SCNC: 105 MMOL/L (ref 95–110)
CHOLEST SERPL-MCNC: 169 MG/DL (ref 120–199)
CHOLEST/HDLC SERPL: 4.3 {RATIO} (ref 2–5)
CO2 SERPL-SCNC: 27 MMOL/L (ref 23–29)
CREAT SERPL-MCNC: 0.9 MG/DL (ref 0.5–1.4)
DIFFERENTIAL METHOD: ABNORMAL
EOSINOPHIL # BLD AUTO: 0.1 K/UL (ref 0–0.5)
EOSINOPHIL NFR BLD: 1.4 % (ref 0–8)
ERYTHROCYTE [DISTWIDTH] IN BLOOD BY AUTOMATED COUNT: 13.4 % (ref 11.5–14.5)
EST. GFR  (NO RACE VARIABLE): >60 ML/MIN/1.73 M^2
GLUCOSE SERPL-MCNC: 101 MG/DL (ref 70–110)
HCT VFR BLD AUTO: 41.8 % (ref 37–48.5)
HDLC SERPL-MCNC: 39 MG/DL (ref 40–75)
HDLC SERPL: 23.1 % (ref 20–50)
HGB BLD-MCNC: 13.9 G/DL (ref 12–16)
IMM GRANULOCYTES # BLD AUTO: 0.01 K/UL (ref 0–0.04)
IMM GRANULOCYTES NFR BLD AUTO: 0.1 % (ref 0–0.5)
LDLC SERPL CALC-MCNC: 105 MG/DL (ref 63–159)
LYMPHOCYTES # BLD AUTO: 2.7 K/UL (ref 1–4.8)
LYMPHOCYTES NFR BLD: 38.7 % (ref 18–48)
MCH RBC QN AUTO: 28.8 PG (ref 27–31)
MCHC RBC AUTO-ENTMCNC: 33.3 G/DL (ref 32–36)
MCV RBC AUTO: 87 FL (ref 82–98)
MONOCYTES # BLD AUTO: 0.4 K/UL (ref 0.3–1)
MONOCYTES NFR BLD: 6.2 % (ref 4–15)
NEUTROPHILS # BLD AUTO: 3.7 K/UL (ref 1.8–7.7)
NEUTROPHILS NFR BLD: 53 % (ref 38–73)
NONHDLC SERPL-MCNC: 130 MG/DL
NRBC BLD-RTO: 0 /100 WBC
PLATELET # BLD AUTO: 350 K/UL (ref 150–450)
PMV BLD AUTO: 8.8 FL (ref 9.2–12.9)
POTASSIUM SERPL-SCNC: 4.2 MMOL/L (ref 3.5–5.1)
PROT SERPL-MCNC: 7.1 G/DL (ref 6–8.4)
RBC # BLD AUTO: 4.83 M/UL (ref 4–5.4)
SODIUM SERPL-SCNC: 138 MMOL/L (ref 136–145)
TRIGL SERPL-MCNC: 125 MG/DL (ref 30–150)
TSH SERPL DL<=0.005 MIU/L-ACNC: 3.09 UIU/ML (ref 0.34–5.6)
WBC # BLD AUTO: 6.98 K/UL (ref 3.9–12.7)

## 2023-01-06 PROCEDURE — 84443 ASSAY THYROID STIM HORMONE: CPT | Performed by: INTERNAL MEDICINE

## 2023-01-06 PROCEDURE — 85025 COMPLETE CBC W/AUTO DIFF WBC: CPT | Performed by: INTERNAL MEDICINE

## 2023-01-06 PROCEDURE — 80053 COMPREHEN METABOLIC PANEL: CPT | Performed by: INTERNAL MEDICINE

## 2023-01-06 PROCEDURE — 36415 COLL VENOUS BLD VENIPUNCTURE: CPT | Performed by: INTERNAL MEDICINE

## 2023-01-06 PROCEDURE — 80061 LIPID PANEL: CPT | Performed by: INTERNAL MEDICINE

## 2023-01-12 ENCOUNTER — OFFICE VISIT (OUTPATIENT)
Dept: INTERNAL MEDICINE | Facility: CLINIC | Age: 48
End: 2023-01-12
Payer: COMMERCIAL

## 2023-01-12 VITALS
HEART RATE: 82 BPM | DIASTOLIC BLOOD PRESSURE: 80 MMHG | TEMPERATURE: 99 F | SYSTOLIC BLOOD PRESSURE: 120 MMHG | OXYGEN SATURATION: 98 % | BODY MASS INDEX: 32.52 KG/M2 | WEIGHT: 240.06 LBS | RESPIRATION RATE: 18 BRPM | HEIGHT: 72 IN

## 2023-01-12 DIAGNOSIS — R06.83 LOUD SNORING: ICD-10-CM

## 2023-01-12 DIAGNOSIS — Z56.6 STRESS AT WORK: ICD-10-CM

## 2023-01-12 DIAGNOSIS — I10 BENIGN ESSENTIAL HYPERTENSION: ICD-10-CM

## 2023-01-12 DIAGNOSIS — F43.9 STRESS AT HOME: ICD-10-CM

## 2023-01-12 DIAGNOSIS — Z00.00 ANNUAL PHYSICAL EXAM: Primary | ICD-10-CM

## 2023-01-12 DIAGNOSIS — R40.0 DAYTIME SOMNOLENCE: ICD-10-CM

## 2023-01-12 PROCEDURE — 3079F PR MOST RECENT DIASTOLIC BLOOD PRESSURE 80-89 MM HG: ICD-10-PCS | Mod: CPTII,S$GLB,, | Performed by: INTERNAL MEDICINE

## 2023-01-12 PROCEDURE — 99396 PREV VISIT EST AGE 40-64: CPT | Mod: S$GLB,,, | Performed by: INTERNAL MEDICINE

## 2023-01-12 PROCEDURE — 1160F PR REVIEW ALL MEDS BY PRESCRIBER/CLIN PHARMACIST DOCUMENTED: ICD-10-PCS | Mod: CPTII,S$GLB,, | Performed by: INTERNAL MEDICINE

## 2023-01-12 PROCEDURE — 99999 PR PBB SHADOW E&M-EST. PATIENT-LVL V: ICD-10-PCS | Mod: PBBFAC,,, | Performed by: INTERNAL MEDICINE

## 2023-01-12 PROCEDURE — 3074F SYST BP LT 130 MM HG: CPT | Mod: CPTII,S$GLB,, | Performed by: INTERNAL MEDICINE

## 2023-01-12 PROCEDURE — 3008F PR BODY MASS INDEX (BMI) DOCUMENTED: ICD-10-PCS | Mod: CPTII,S$GLB,, | Performed by: INTERNAL MEDICINE

## 2023-01-12 PROCEDURE — 3074F PR MOST RECENT SYSTOLIC BLOOD PRESSURE < 130 MM HG: ICD-10-PCS | Mod: CPTII,S$GLB,, | Performed by: INTERNAL MEDICINE

## 2023-01-12 PROCEDURE — 3079F DIAST BP 80-89 MM HG: CPT | Mod: CPTII,S$GLB,, | Performed by: INTERNAL MEDICINE

## 2023-01-12 PROCEDURE — 99396 PR PREVENTIVE VISIT,EST,40-64: ICD-10-PCS | Mod: S$GLB,,, | Performed by: INTERNAL MEDICINE

## 2023-01-12 PROCEDURE — 1159F PR MEDICATION LIST DOCUMENTED IN MEDICAL RECORD: ICD-10-PCS | Mod: CPTII,S$GLB,, | Performed by: INTERNAL MEDICINE

## 2023-01-12 PROCEDURE — 99999 PR PBB SHADOW E&M-EST. PATIENT-LVL V: CPT | Mod: PBBFAC,,, | Performed by: INTERNAL MEDICINE

## 2023-01-12 PROCEDURE — 1160F RVW MEDS BY RX/DR IN RCRD: CPT | Mod: CPTII,S$GLB,, | Performed by: INTERNAL MEDICINE

## 2023-01-12 PROCEDURE — 1159F MED LIST DOCD IN RCRD: CPT | Mod: CPTII,S$GLB,, | Performed by: INTERNAL MEDICINE

## 2023-01-12 PROCEDURE — 3008F BODY MASS INDEX DOCD: CPT | Mod: CPTII,S$GLB,, | Performed by: INTERNAL MEDICINE

## 2023-01-12 SDOH — SOCIAL DETERMINANTS OF HEALTH (SDOH): OTHER PHYSICAL AND MENTAL STRAIN RELATED TO WORK: Z56.6

## 2023-01-12 NOTE — PROGRESS NOTES
[facility] Note    Chief Complaint      Chief Complaint   Patient presents with    Annual Exam       History of Present Illness      Matilde Redding is a 47 y.o. female who presents today for   Chief Complaint   Patient presents with    Annual Exam   .  Patient comes to appointment here for annual preventative visit . She had full labs done this visit all reviewed with patient today . She complains of constant fatigue . She has frequent nocturnal awakenings ,  states loud snoring     Problem List Items Addressed This Visit          Neuro    Daytime somnolence       Psychiatric    Stress at work    Overview     See below            Cardiac/Vascular    Benign essential hypertension    Overview     Now on amlodipine blood pressure is well controlled             Other    Annual physical exam - Primary    Overview     pe documented all labs reviewed with patient today           Stress at home    Overview     Cont welbutrin 300 mg is working well          Loud snoring    Overview      Needs sleep study to assess              Past Medical History:  History reviewed. No pertinent past medical history.    Past Surgical History:  Past Surgical History:   Procedure Laterality Date    HYSTERECTOMY  6/2017    Ovary removed and fallopian tubes removed    PARTIAL HYSTERECTOMY  2017    WISDOM TOOTH EXTRACTION         Family History:  family history includes Cancer in her father; Heart disease in her father; Hypertension in her father and mother.    Social History:  Social History     Socioeconomic History    Marital status:    Tobacco Use    Smoking status: Never    Smokeless tobacco: Never   Substance and Sexual Activity    Alcohol use: Yes     Alcohol/week: 2.0 standard drinks     Types: 1 Glasses of wine, 1 Cans of beer per week    Drug use: Never    Sexual activity: Not Currently     Partners: Male     Birth control/protection: Other-see comments     Comment: Birth control pill      Social Determinants of Health     Financial Resource Strain: Low Risk     Difficulty of Paying Living Expenses: Not hard at all   Food Insecurity: No Food Insecurity    Worried About Running Out of Food in the Last Year: Never true    Ran Out of Food in the Last Year: Never true   Transportation Needs: No Transportation Needs    Lack of Transportation (Medical): No    Lack of Transportation (Non-Medical): No   Physical Activity: Insufficiently Active    Days of Exercise per Week: 5 days    Minutes of Exercise per Session: 20 min   Stress: Stress Concern Present    Feeling of Stress : To some extent   Social Connections: Unknown    Frequency of Communication with Friends and Family: Three times a week    Frequency of Social Gatherings with Friends and Family: Once a week    Active Member of Clubs or Organizations: No    Attends Club or Organization Meetings: Never    Marital Status:    Housing Stability: Low Risk     Unable to Pay for Housing in the Last Year: No    Number of Places Lived in the Last Year: 1    Unstable Housing in the Last Year: No       Review of Systems:   Review of Systems   Constitutional:  Positive for malaise/fatigue.   HENT:  Negative for hearing loss.    Eyes:  Negative for discharge.   Respiratory:  Negative for wheezing.    Cardiovascular:  Negative for chest pain and palpitations.   Gastrointestinal:  Negative for blood in stool, constipation, diarrhea and vomiting.   Genitourinary:  Negative for dysuria and hematuria.   Musculoskeletal:  Negative for neck pain.   Neurological:  Negative for weakness and headaches.   Endo/Heme/Allergies:  Negative for polydipsia.       Medications:  Outpatient Encounter Medications as of 1/12/2023   Medication Sig Dispense Refill    amLODIPine (NORVASC) 5 MG tablet Take 1 tablet (5 mg total) by mouth once daily. 30 tablet 11    buPROPion (WELLBUTRIN XL) 300 MG 24 hr tablet Take 1 tablet (300 mg total) by mouth once daily. 30 tablet 5     cetirizine (ZYRTEC) 10 MG tablet Take 10 mg by mouth once daily.      norethin-e.estradiol triphasic (ARANELLE, 28,) 0.5/1/0.5-35 mg-mcg Tab       [DISCONTINUED] pantoprazole (PROTONIX) 40 MG tablet Take 1 tablet (40 mg total) by mouth once daily. 30 tablet 5     No facility-administered encounter medications on file as of 1/12/2023.        Allergies:  Review of patient's allergies indicates:  No Known Allergies      Physical Exam         Vitals:    01/12/23 1442   BP: 120/80   Pulse: 82   Resp: 18   Temp: 98.7 °F (37.1 °C)         Physical Exam  Constitutional:       Appearance: She is well-developed.   Eyes:      Pupils: Pupils are equal, round, and reactive to light.   Neck:      Thyroid: No thyromegaly.   Cardiovascular:      Rate and Rhythm: Normal rate.      Heart sounds: Normal heart sounds. No murmur heard.    No friction rub. No gallop.   Pulmonary:      Breath sounds: Normal breath sounds.   Abdominal:      General: Bowel sounds are normal.      Palpations: Abdomen is soft.   Musculoskeletal:         General: Normal range of motion.      Cervical back: Normal range of motion.   Lymphadenopathy:      Cervical: No cervical adenopathy.   Skin:     General: Skin is warm.      Findings: No rash.   Neurological:      Mental Status: She is alert and oriented to person, place, and time.      Cranial Nerves: No cranial nerve deficit.   Psychiatric:         Behavior: Behavior normal.        Laboratory:  CBC:  Recent Labs   Lab Result Units 01/06/23  0638   WBC K/uL 6.98   RBC M/uL 4.83   Hemoglobin g/dL 13.9   Hematocrit % 41.8   Platelets K/uL 350   MCV fL 87   MCH pg 28.8   MCHC g/dL 33.3     CMP:  Recent Labs   Lab Result Units 01/06/23  0638   Glucose mg/dL 101   Calcium mg/dL 8.4*   Albumin g/dL 3.2*   Total Protein g/dL 7.1   Sodium mmol/L 138   Potassium mmol/L 4.2   CO2 mmol/L 27   Chloride mmol/L 105   BUN mg/dL 16   Alkaline Phosphatase U/L 79   ALT U/L 26   AST U/L 15   Total Bilirubin mg/dL 0.6      URINALYSIS:  No results for input(s): COLORU, CLARITYU, SPECGRAV, PHUR, PROTEINUA, GLUCOSEU, BILIRUBINCON, BLOODU, WBCU, RBCU, BACTERIA, MUCUS, NITRITE, LEUKOCYTESUR, UROBILINOGEN, HYALINECASTS in the last 2160 hours.   LIPIDS:  Recent Labs   Lab Result Units 01/06/23  0638   TSH uIU/mL 3.090   HDL mg/dL 39*   Cholesterol mg/dL 169   Triglycerides mg/dL 125   LDL Cholesterol mg/dL 105.0   HDL/Cholesterol Ratio % 23.1   Non-HDL Cholesterol mg/dL 130   Total Cholesterol/HDL Ratio  4.3     TSH:  Recent Labs   Lab Result Units 01/06/23  0638   TSH uIU/mL 3.090     A1C:  No results for input(s): HGBA1C in the last 2160 hours.    Radiology:        Assessment:     Matilde Redding is a 47 y.o.female with:    Annual physical exam    Loud snoring  -     Ambulatory referral/consult to Sleep Disorders; Future; Expected date: 01/19/2023    Daytime somnolence  -     Ambulatory referral/consult to Sleep Disorders; Future; Expected date: 01/19/2023    Stress at work    Stress at home    Benign essential hypertension          Plan:     Problem List Items Addressed This Visit          Neuro    Daytime somnolence       Psychiatric    Stress at work    Overview     See below            Cardiac/Vascular    Benign essential hypertension    Overview     Now on amlodipine blood pressure is well controlled             Other    Annual physical exam - Primary    Overview     pe documented all labs reviewed with patient today           Stress at home    Overview     Cont welbutrin 300 mg is working well          Loud snoring    Overview      Needs sleep study to assess            As above, continue current medications and maintain follow up with specialists.  Return to clinic in 6 months.      Frederick W Dantagnan [facility] [place]

## 2023-01-24 ENCOUNTER — PATIENT MESSAGE (OUTPATIENT)
Dept: INTERNAL MEDICINE | Facility: CLINIC | Age: 48
End: 2023-01-24
Payer: COMMERCIAL

## 2023-01-24 DIAGNOSIS — G47.00 INSOMNIA, UNSPECIFIED TYPE: Primary | ICD-10-CM

## 2023-01-24 RX ORDER — TRAZODONE HYDROCHLORIDE 100 MG/1
100 TABLET ORAL NIGHTLY
Qty: 30 TABLET | Refills: 3 | Status: SHIPPED | OUTPATIENT
Start: 2023-01-24 | End: 2023-03-03 | Stop reason: SDUPTHER

## 2023-03-03 ENCOUNTER — PATIENT MESSAGE (OUTPATIENT)
Dept: PRIMARY CARE CLINIC | Facility: CLINIC | Age: 48
End: 2023-03-03
Payer: COMMERCIAL

## 2023-03-03 DIAGNOSIS — G47.00 INSOMNIA, UNSPECIFIED TYPE: ICD-10-CM

## 2023-03-03 DIAGNOSIS — Z56.6 STRESS AT WORK: ICD-10-CM

## 2023-03-03 DIAGNOSIS — F43.9 STRESS AT HOME: ICD-10-CM

## 2023-03-03 RX ORDER — BUPROPION HYDROCHLORIDE 300 MG/1
300 TABLET ORAL DAILY
Qty: 90 TABLET | Refills: 3 | Status: SHIPPED | OUTPATIENT
Start: 2023-03-03 | End: 2024-03-15 | Stop reason: SDUPTHER

## 2023-03-03 RX ORDER — TRAZODONE HYDROCHLORIDE 100 MG/1
100 TABLET ORAL NIGHTLY
Qty: 90 TABLET | Refills: 3 | Status: SHIPPED | OUTPATIENT
Start: 2023-03-03 | End: 2023-09-18

## 2023-03-03 RX ORDER — AMLODIPINE BESYLATE 5 MG/1
5 TABLET ORAL DAILY
Qty: 90 TABLET | Refills: 3 | Status: SHIPPED | OUTPATIENT
Start: 2023-03-03 | End: 2024-01-04

## 2023-03-03 SDOH — SOCIAL DETERMINANTS OF HEALTH (SDOH): OTHER PHYSICAL AND MENTAL STRAIN RELATED TO WORK: Z56.6

## 2023-03-22 ENCOUNTER — PATIENT MESSAGE (OUTPATIENT)
Dept: ADMINISTRATIVE | Facility: HOSPITAL | Age: 48
End: 2023-03-22
Payer: COMMERCIAL

## 2023-03-26 DIAGNOSIS — Z12.11 SCREENING FOR COLON CANCER: ICD-10-CM

## 2023-04-08 ENCOUNTER — PATIENT MESSAGE (OUTPATIENT)
Dept: PRIMARY CARE CLINIC | Facility: CLINIC | Age: 48
End: 2023-04-08
Payer: COMMERCIAL

## 2023-04-13 ENCOUNTER — OCCUPATIONAL HEALTH (OUTPATIENT)
Dept: URGENT CARE | Facility: CLINIC | Age: 48
End: 2023-04-13

## 2023-04-17 PROBLEM — Z00.00 ANNUAL PHYSICAL EXAM: Status: RESOLVED | Noted: 2019-12-05 | Resolved: 2023-04-17

## 2023-04-21 ENCOUNTER — PATIENT MESSAGE (OUTPATIENT)
Dept: ADMINISTRATIVE | Facility: HOSPITAL | Age: 48
End: 2023-04-21
Payer: COMMERCIAL

## 2023-05-04 ENCOUNTER — PATIENT OUTREACH (OUTPATIENT)
Dept: ADMINISTRATIVE | Facility: HOSPITAL | Age: 48
End: 2023-05-04
Payer: COMMERCIAL

## 2023-05-04 ENCOUNTER — PATIENT MESSAGE (OUTPATIENT)
Dept: ADMINISTRATIVE | Facility: HOSPITAL | Age: 48
End: 2023-05-04
Payer: COMMERCIAL

## 2023-05-04 NOTE — PROGRESS NOTES
Health Maintenance Due   Topic Date Due    HIV Screening  Never done    TETANUS VACCINE  Never done    Hemoglobin A1c (Diabetic Prevention Screening)  Never done    Colorectal Cancer Screening  Never done    COVID-19 Vaccine (4 - Booster for Pfizer series) 03/09/2022     Chart review done. HM updated. Immunizations reviewed & updated. Care Everywhere updated.  Portal Message sent re:  Annual PCP Visit and return of Fit Kit.

## 2023-06-13 ENCOUNTER — OCCUPATIONAL HEALTH (OUTPATIENT)
Dept: URGENT CARE | Facility: CLINIC | Age: 48
End: 2023-06-13

## 2023-06-13 DIAGNOSIS — Z02.83 ENCOUNTER FOR DRUG SCREENING: Primary | ICD-10-CM

## 2023-06-13 LAB
CTP QC/QA: YES
POC 10 PANEL DRUG SCREEN: NEGATIVE

## 2023-06-13 PROCEDURE — 80305 POCT RAPID DRUG SCREEN 10 PANEL: ICD-10-PCS | Mod: S$GLB,,, | Performed by: FAMILY MEDICINE

## 2023-06-13 PROCEDURE — 80305 DRUG TEST PRSMV DIR OPT OBS: CPT | Mod: S$GLB,,, | Performed by: FAMILY MEDICINE

## 2023-07-25 ENCOUNTER — TELEPHONE (OUTPATIENT)
Dept: PRIMARY CARE CLINIC | Facility: CLINIC | Age: 48
End: 2023-07-25
Payer: COMMERCIAL

## 2023-07-25 NOTE — TELEPHONE ENCOUNTER
----- Message from Fernandez Wilson sent at 7/25/2023  9:56 AM CDT -----  Pt Requesting Call Back    Who called: pt  Who called for pt:  Best call back #: [phone]  Add notes: pt requests callback from nurse to let her know if she needs to have blood work done or not before possibly appt

## 2023-07-25 NOTE — TELEPHONE ENCOUNTER
----- Message from Fernandez Wilson sent at 7/25/2023  9:55 AM CDT -----  Type:  Sooner Appointment Request    Caller is requesting a sooner appointment.  Caller declined first available appointment listed below.  Caller will not accept being placed on the waitlist and is requesting a message be sent to doctor.  Name of Caller: pt  When is the first available appointment?  Symptoms: chest pains; sweating; shortness of breath  Would the patient rather a call back or a response via [facility]?  call  Best Call Back Number: [phone]  Additional Information:  pt wants appt 7/26/23 or 7/27/23

## 2023-07-27 ENCOUNTER — HOSPITAL ENCOUNTER (OUTPATIENT)
Dept: RADIOLOGY | Facility: HOSPITAL | Age: 48
Discharge: HOME | End: 2023-07-27
Attending: NURSE PRACTITIONER
Payer: COMMERCIAL

## 2023-07-27 ENCOUNTER — OFFICE VISIT (OUTPATIENT)
Dept: PRIMARY CARE CLINIC | Facility: CLINIC | Age: 48
End: 2023-07-27
Payer: COMMERCIAL

## 2023-07-27 VITALS
WEIGHT: 261.94 LBS | SYSTOLIC BLOOD PRESSURE: 132 MMHG | RESPIRATION RATE: 16 BRPM | OXYGEN SATURATION: 99 % | DIASTOLIC BLOOD PRESSURE: 72 MMHG | BODY MASS INDEX: 35.52 KG/M2 | HEART RATE: 76 BPM

## 2023-07-27 DIAGNOSIS — J45.909 CHILDHOOD ASTHMA, UNSPECIFIED ASTHMA SEVERITY, UNSPECIFIED WHETHER COMPLICATED, UNSPECIFIED WHETHER PERSISTENT: ICD-10-CM

## 2023-07-27 DIAGNOSIS — R63.5 WEIGHT GAIN: ICD-10-CM

## 2023-07-27 DIAGNOSIS — G47.9 SLEEP DISORDER: ICD-10-CM

## 2023-07-27 DIAGNOSIS — R61 DIAPHORESIS: ICD-10-CM

## 2023-07-27 DIAGNOSIS — R53.83 FATIGUE, UNSPECIFIED TYPE: ICD-10-CM

## 2023-07-27 DIAGNOSIS — R07.9 CHEST PAIN, UNSPECIFIED TYPE: ICD-10-CM

## 2023-07-27 DIAGNOSIS — R06.02 SOB (SHORTNESS OF BREATH): ICD-10-CM

## 2023-07-27 DIAGNOSIS — R06.83 SNORES: ICD-10-CM

## 2023-07-27 DIAGNOSIS — R06.02 SOB (SHORTNESS OF BREATH): Primary | ICD-10-CM

## 2023-07-27 PROCEDURE — 3008F PR BODY MASS INDEX (BMI) DOCUMENTED: ICD-10-PCS | Mod: CPTII,S$GLB,, | Performed by: NURSE PRACTITIONER

## 2023-07-27 PROCEDURE — 99214 OFFICE O/P EST MOD 30 MIN: CPT | Mod: S$GLB,,, | Performed by: NURSE PRACTITIONER

## 2023-07-27 PROCEDURE — 1160F PR REVIEW ALL MEDS BY PRESCRIBER/CLIN PHARMACIST DOCUMENTED: ICD-10-PCS | Mod: CPTII,S$GLB,, | Performed by: NURSE PRACTITIONER

## 2023-07-27 PROCEDURE — 93010 EKG 12-LEAD: ICD-10-PCS | Mod: S$GLB,,, | Performed by: INTERNAL MEDICINE

## 2023-07-27 PROCEDURE — 3044F HG A1C LEVEL LT 7.0%: CPT | Mod: CPTII,S$GLB,, | Performed by: NURSE PRACTITIONER

## 2023-07-27 PROCEDURE — 1159F MED LIST DOCD IN RCRD: CPT | Mod: CPTII,S$GLB,, | Performed by: NURSE PRACTITIONER

## 2023-07-27 PROCEDURE — 99999 PR PBB SHADOW E&M-EST. PATIENT-LVL III: ICD-10-PCS | Mod: PBBFAC,,, | Performed by: NURSE PRACTITIONER

## 2023-07-27 PROCEDURE — 71045 XR CHEST 1 VIEW: ICD-10-PCS | Mod: 26,,, | Performed by: RADIOLOGY

## 2023-07-27 PROCEDURE — 71045 X-RAY EXAM CHEST 1 VIEW: CPT | Mod: TC

## 2023-07-27 PROCEDURE — 3078F DIAST BP <80 MM HG: CPT | Mod: CPTII,S$GLB,, | Performed by: NURSE PRACTITIONER

## 2023-07-27 PROCEDURE — 1159F PR MEDICATION LIST DOCUMENTED IN MEDICAL RECORD: ICD-10-PCS | Mod: CPTII,S$GLB,, | Performed by: NURSE PRACTITIONER

## 2023-07-27 PROCEDURE — 3075F SYST BP GE 130 - 139MM HG: CPT | Mod: CPTII,S$GLB,, | Performed by: NURSE PRACTITIONER

## 2023-07-27 PROCEDURE — 93010 ELECTROCARDIOGRAM REPORT: CPT | Mod: S$GLB,,, | Performed by: INTERNAL MEDICINE

## 2023-07-27 PROCEDURE — 99214 PR OFFICE/OUTPT VISIT, EST, LEVL IV, 30-39 MIN: ICD-10-PCS | Mod: S$GLB,,, | Performed by: NURSE PRACTITIONER

## 2023-07-27 PROCEDURE — 99999 PR PBB SHADOW E&M-EST. PATIENT-LVL III: CPT | Mod: PBBFAC,,, | Performed by: NURSE PRACTITIONER

## 2023-07-27 PROCEDURE — 3078F PR MOST RECENT DIASTOLIC BLOOD PRESSURE < 80 MM HG: ICD-10-PCS | Mod: CPTII,S$GLB,, | Performed by: NURSE PRACTITIONER

## 2023-07-27 PROCEDURE — 93005 EKG 12-LEAD: ICD-10-PCS | Mod: S$GLB,,, | Performed by: NURSE PRACTITIONER

## 2023-07-27 PROCEDURE — 93005 ELECTROCARDIOGRAM TRACING: CPT | Mod: S$GLB,,, | Performed by: NURSE PRACTITIONER

## 2023-07-27 PROCEDURE — 3075F PR MOST RECENT SYSTOLIC BLOOD PRESS GE 130-139MM HG: ICD-10-PCS | Mod: CPTII,S$GLB,, | Performed by: NURSE PRACTITIONER

## 2023-07-27 PROCEDURE — 71045 X-RAY EXAM CHEST 1 VIEW: CPT | Mod: 26,,, | Performed by: RADIOLOGY

## 2023-07-27 PROCEDURE — 1160F RVW MEDS BY RX/DR IN RCRD: CPT | Mod: CPTII,S$GLB,, | Performed by: NURSE PRACTITIONER

## 2023-07-27 PROCEDURE — 3044F PR MOST RECENT HEMOGLOBIN A1C LEVEL <7.0%: ICD-10-PCS | Mod: CPTII,S$GLB,, | Performed by: NURSE PRACTITIONER

## 2023-07-27 PROCEDURE — 3008F BODY MASS INDEX DOCD: CPT | Mod: CPTII,S$GLB,, | Performed by: NURSE PRACTITIONER

## 2023-07-28 ENCOUNTER — PATIENT MESSAGE (OUTPATIENT)
Dept: PRIMARY CARE CLINIC | Facility: CLINIC | Age: 48
End: 2023-07-28
Payer: COMMERCIAL

## 2023-08-20 ENCOUNTER — PATIENT MESSAGE (OUTPATIENT)
Dept: PRIMARY CARE CLINIC | Facility: CLINIC | Age: 48
End: 2023-08-20
Payer: COMMERCIAL

## 2023-08-21 ENCOUNTER — TELEPHONE (OUTPATIENT)
Dept: DERMATOLOGY | Facility: CLINIC | Age: 48
End: 2023-08-21
Payer: COMMERCIAL

## 2023-09-01 ENCOUNTER — PATIENT MESSAGE (OUTPATIENT)
Dept: PRIMARY CARE CLINIC | Facility: CLINIC | Age: 48
End: 2023-09-01
Payer: COMMERCIAL

## 2023-09-01 DIAGNOSIS — K30 ACID INDIGESTION: Primary | ICD-10-CM

## 2023-09-05 RX ORDER — PANTOPRAZOLE SODIUM 40 MG/1
40 TABLET, DELAYED RELEASE ORAL DAILY
Qty: 30 TABLET | Refills: 3 | Status: SHIPPED | OUTPATIENT
Start: 2023-09-05 | End: 2024-01-18

## 2023-09-18 NOTE — PROGRESS NOTES
[facility] Note    Chief Complaint      Chief Complaint   Patient presents with    Shortness of Breath    Chest Pain     X 2 weeks. Patients states she went to get a coke and she started to fell better after drinking the coke.      History of Present Illness      Matilde Redding is a 48 y.o. female patient of Dr. Cole who presents today for problems taking a deep breath x1 month, patient reports 2 weeks ago started with chest pain sweating-she drank a Coke and felt better, thinking maybe it was her blood sugar.  Patient reports had asthma as a child, and intermittently feels like she needs to take a deep breath like she is not getting enough oxygen in.  Then again a week ago started with sweating and chest pain that lasted from the morning up until about 3:00 p.m..  She had another bout of feeling unsteady and achy, drink a cook and again felt better.  She is in a new job but does not feel stressed.  Never had fever chills, abdominal pain nausea vomiting or diarrhea, dizziness or syncope  Health Maintenance   Topic Date Due    TETANUS VACCINE  Never done    Colorectal Cancer Screening  Never done    Mammogram  10/28/2023    Lipid Panel  01/06/2028    Hepatitis C Screening  Completed       No past medical history on file.    Past Surgical History:   Procedure Laterality Date    HYSTERECTOMY  6/2017    Ovary removed and fallopian tubes removed    PARTIAL HYSTERECTOMY  2017    WISDOM TOOTH EXTRACTION         family history includes Cancer in her father; Heart disease in her father; Hypertension in her father and mother.    Social History     Tobacco Use    Smoking status: Never    Smokeless tobacco: Never   Substance Use Topics    Alcohol use: Yes     Alcohol/week: 2.0 standard drinks of alcohol     Types: 1 Glasses of wine, 1 Cans of beer per week    Drug use: Never       Review of Systems   Constitutional:  Positive for malaise/fatigue. Negative for chills and fever.   Eyes:  Negative for  blurred vision.   Respiratory:  Positive for shortness of breath. Negative for cough.    Cardiovascular:  Positive for chest pain. Negative for palpitations.   Gastrointestinal:  Negative for abdominal pain, diarrhea, nausea and vomiting.   Genitourinary:  Negative for dysuria.   Musculoskeletal:  Negative for back pain and falls.   Neurological:  Negative for dizziness, tingling, tremors, loss of consciousness, weakness and headaches.        Outpatient Encounter Medications as of 7/27/2023   Medication Sig Dispense Refill    amLODIPine (NORVASC) 5 MG tablet Take 1 tablet (5 mg total) by mouth once daily. 90 tablet 3    buPROPion (WELLBUTRIN XL) 300 MG 24 hr tablet Take 1 tablet (300 mg total) by mouth once daily. 90 tablet 3    cetirizine (ZYRTEC) 10 MG tablet Take 10 mg by mouth once daily.      norethin-e.estradiol triphasic (ARANELLE, 28,) 0.5/1/0.5-35 mg-mcg Tab       traZODone (DESYREL) 100 MG tablet Take 1 tablet (100 mg total) by mouth every evening. 90 tablet 3     No facility-administered encounter medications on file as of 7/27/2023.        Review of patient's allergies indicates:  No Known Allergies    Physical Exam      Vital Signs  Pulse: 76  Resp: 16  SpO2: 99 %  BP: 132/72  BP Location: Right arm  Patient Position: Sitting  Height and Weight  Weight: 118.8 kg (261 lb 14.5 oz)    Physical Exam  Vitals and nursing note reviewed.   Constitutional:       General: She is not in acute distress.     Appearance: Normal appearance. She is well-developed. She is not ill-appearing.   HENT:      Head: Normocephalic and atraumatic.      Right Ear: External ear normal.      Left Ear: External ear normal.   Eyes:      Conjunctiva/sclera: Conjunctivae normal.      Pupils: Pupils are equal, round, and reactive to light.   Neck:      Thyroid: No thyromegaly.      Vascular: No JVD.      Trachea: No tracheal deviation.   Cardiovascular:      Rate and Rhythm: Normal rate and regular rhythm.      Heart sounds: Normal  "heart sounds. No murmur heard.  Pulmonary:      Effort: Pulmonary effort is normal.      Breath sounds: Normal breath sounds.   Chest:      Chest wall: No tenderness.   Abdominal:      General: Bowel sounds are normal.      Palpations: Abdomen is soft.      Tenderness: There is no abdominal tenderness. There is no guarding.   Musculoskeletal:         General: Normal range of motion.      Cervical back: Normal range of motion and neck supple.   Lymphadenopathy:      Cervical: No cervical adenopathy.   Skin:     General: Skin is warm and dry.   Neurological:      General: No focal deficit present.      Mental Status: She is alert and oriented to person, place, and time.   Psychiatric:         Mood and Affect: Mood normal.         Behavior: Behavior normal.         Thought Content: Thought content normal.         Judgment: Judgment normal.          Laboratory:  CBC:  Lab Results   Component Value Date    WBC 10.69 07/27/2023    RBC 4.98 07/27/2023    HGB 14.4 07/27/2023    HCT 43.8 07/27/2023     07/27/2023    MCV 88 07/27/2023    MCH 28.9 07/27/2023    MCHC 32.9 07/27/2023    MCHC 33.3 01/06/2023    MCHC 32.1 04/14/2022     CMP:  Lab Results   Component Value Date    GLU 78 07/27/2023    CALCIUM 8.3 (L) 07/27/2023    ALBUMIN 3.1 (L) 07/27/2023    PROT 6.9 07/27/2023     07/27/2023    K 3.9 07/27/2023    CO2 27 07/27/2023     07/27/2023    BUN 9 07/27/2023    ALKPHOS 82 07/27/2023    ALT 12 07/27/2023    AST 11 07/27/2023    BILITOT 0.3 07/27/2023    BILITOT 0.6 01/06/2023    BILITOT 0.7 04/14/2022     URINALYSIS:  No results found for: "COLORU", "CLARITYU", "SPECGRAV", "PHUR", "PROTEINUA", "GLUCOSEU", "BILIRUBINCON", "BLOODU", "WBCU", "RBCU", "BACTERIA", "MUCUS", "NITRITE", "LEUKOCYTESUR", "UROBILINOGEN", "HYALINECASTS"   LIPIDS:  Lab Results   Component Value Date    TSH 2.694 07/27/2023    TSH 3.090 01/06/2023    TSH 1.192 04/14/2022    HDL 39 (L) 01/06/2023    HDL 55 12/22/2021    HDL 57 " 12/18/2020    CHOL 169 01/06/2023    CHOL 196 12/22/2021    CHOL 204 (H) 12/18/2020    TRIG 125 01/06/2023    TRIG 88 12/22/2021    TRIG 95 12/18/2020    LDLCALC 105.0 01/06/2023    LDLCALC 123.4 12/22/2021    LDLCALC 127 (H) 12/18/2020    CHOLHDL 23.1 01/06/2023    CHOLHDL 28.1 12/22/2021    CHOLHDL 3.6 12/18/2020    NONHDLCHOL 130 01/06/2023    NONHDLCHOL 141 12/22/2021    NONHDLCHOL 147 (H) 12/18/2020    TOTALCHOLEST 4.3 01/06/2023    TOTALCHOLEST 3.6 12/22/2021    TOTALCHOLEST 3.8 11/25/2019     TSH:  Lab Results   Component Value Date    TSH 2.694 07/27/2023    TSH 3.090 01/06/2023    TSH 1.192 04/14/2022     A1C:  Lab Results   Component Value Date    HGBA1C 5.0 07/27/2023         Assessment/Plan     Matilde Redding is a 48 y.o.female with:    SOB (shortness of breath)  -     X-Ray Chest 1 View; Future; Expected date: 07/27/2023  -     IN OFFICE EKG 12-LEAD (to Muse)  -     CBC Auto Differential; Future; Expected date: 07/27/2023  -     Comprehensive Metabolic Panel; Future; Expected date: 07/27/2023  -     Hemoglobin A1C; Future; Expected date: 07/27/2023  -     TSH; Future; Expected date: 07/27/2023  -     T4, Free; Future; Expected date: 07/27/2023    Chest pain, unspecified type  -     X-Ray Chest 1 View; Future; Expected date: 07/27/2023  -     IN OFFICE EKG 12-LEAD (to Muse)  -     CBC Auto Differential; Future; Expected date: 07/27/2023  -     Comprehensive Metabolic Panel; Future; Expected date: 07/27/2023  -     Hemoglobin A1C; Future; Expected date: 07/27/2023  -     TSH; Future; Expected date: 07/27/2023  -     T4, Free; Future; Expected date: 07/27/2023    Fatigue, unspecified type  -     X-Ray Chest 1 View; Future; Expected date: 07/27/2023  -     IN OFFICE EKG 12-LEAD (to Muse)  -     CBC Auto Differential; Future; Expected date: 07/27/2023  -     Comprehensive Metabolic Panel; Future; Expected date: 07/27/2023  -     Hemoglobin A1C; Future; Expected date: 07/27/2023  -     TSH; Future; Expected  date: 07/27/2023  -     T4, Free; Future; Expected date: 07/27/2023    Sleep disorder  -     X-Ray Chest 1 View; Future; Expected date: 07/27/2023  -     IN OFFICE EKG 12-LEAD (to Muse)  -     CBC Auto Differential; Future; Expected date: 07/27/2023  -     Comprehensive Metabolic Panel; Future; Expected date: 07/27/2023  -     Hemoglobin A1C; Future; Expected date: 07/27/2023  -     TSH; Future; Expected date: 07/27/2023  -     T4, Free; Future; Expected date: 07/27/2023    Snores  -     X-Ray Chest 1 View; Future; Expected date: 07/27/2023  -     IN OFFICE EKG 12-LEAD (to Muse)  -     CBC Auto Differential; Future; Expected date: 07/27/2023  -     Comprehensive Metabolic Panel; Future; Expected date: 07/27/2023  -     Hemoglobin A1C; Future; Expected date: 07/27/2023  -     TSH; Future; Expected date: 07/27/2023  -     T4, Free; Future; Expected date: 07/27/2023    Weight gain  -     X-Ray Chest 1 View; Future; Expected date: 07/27/2023  -     IN OFFICE EKG 12-LEAD (to Muse)  -     CBC Auto Differential; Future; Expected date: 07/27/2023  -     Comprehensive Metabolic Panel; Future; Expected date: 07/27/2023  -     Hemoglobin A1C; Future; Expected date: 07/27/2023  -     TSH; Future; Expected date: 07/27/2023  -     T4, Free; Future; Expected date: 07/27/2023    Childhood asthma, unspecified asthma severity, unspecified whether complicated, unspecified whether persistent  -     X-Ray Chest 1 View; Future; Expected date: 07/27/2023  -     IN OFFICE EKG 12-LEAD (to Muse)  -     CBC Auto Differential; Future; Expected date: 07/27/2023  -     Comprehensive Metabolic Panel; Future; Expected date: 07/27/2023  -     Hemoglobin A1C; Future; Expected date: 07/27/2023  -     TSH; Future; Expected date: 07/27/2023  -     T4, Free; Future; Expected date: 07/27/2023    Diaphoresis  -     X-Ray Chest 1 View; Future; Expected date: 07/27/2023  -     IN OFFICE EKG 12-LEAD (to Muse)  -     CBC Auto Differential; Future; Expected  date: 07/27/2023  -     Comprehensive Metabolic Panel; Future; Expected date: 07/27/2023  -     Hemoglobin A1C; Future; Expected date: 07/27/2023  -     TSH; Future; Expected date: 07/27/2023  -     T4, Free; Future; Expected date: 07/27/2023          Health Maintenance Due   Topic Date Due    HIV Screening  Never done    TETANUS VACCINE  Never done    Colorectal Cancer Screening  Never done    COVID-19 Vaccine (4 - Pfizer series) 03/09/2022    Influenza Vaccine (1) 09/01/2023    Mammogram  10/28/2023        I spent 34 minutes on the day of this encounter for preparing for, evaluating, treating, and managing this patient.      -Continue current medications and maintain follow up with specialists.  Return to clinic in Follow up if symptoms worsen or fail to improve.       JERRICA Pisano  [facility]

## 2023-09-26 ENCOUNTER — CLINICAL SUPPORT (OUTPATIENT)
Dept: OTHER | Facility: CLINIC | Age: 48
End: 2023-09-26
Payer: COMMERCIAL

## 2023-09-26 DIAGNOSIS — Z00.8 ENCOUNTER FOR OTHER GENERAL EXAMINATION: ICD-10-CM

## 2023-09-26 PROCEDURE — 82947 ASSAY GLUCOSE BLOOD QUANT: CPT | Mod: QW,S$GLB,, | Performed by: INTERNAL MEDICINE

## 2023-09-26 PROCEDURE — 99401 PREV MED CNSL INDIV APPRX 15: CPT | Mod: S$GLB,,, | Performed by: INTERNAL MEDICINE

## 2023-09-26 PROCEDURE — 82947 PR  ASSAY QUANTITATIVE,BLOOD GLUCOSE: ICD-10-PCS | Mod: QW,S$GLB,, | Performed by: INTERNAL MEDICINE

## 2023-09-26 PROCEDURE — 99401 PR PREVENT COUNSEL,INDIV,15 MIN: ICD-10-PCS | Mod: S$GLB,,, | Performed by: INTERNAL MEDICINE

## 2023-09-26 PROCEDURE — 80061 PR  LIPID PANEL: ICD-10-PCS | Mod: QW,S$GLB,, | Performed by: INTERNAL MEDICINE

## 2023-09-26 PROCEDURE — 80061 LIPID PANEL: CPT | Mod: QW,S$GLB,, | Performed by: INTERNAL MEDICINE

## 2023-09-27 VITALS
SYSTOLIC BLOOD PRESSURE: 136 MMHG | DIASTOLIC BLOOD PRESSURE: 88 MMHG | HEIGHT: 70 IN | BODY MASS INDEX: 37.8 KG/M2 | WEIGHT: 264 LBS

## 2023-09-27 LAB
HDLC SERPL-MCNC: 48 MG/DL
POC CHOLESTEROL, LDL (DOCK): 140 MG/DL
POC CHOLESTEROL, TOTAL: 210 MG/DL
POC GLUCOSE, FASTING: 85 MG/DL (ref 60–110)
TRIGL SERPL-MCNC: 121 MG/DL

## 2023-10-03 ENCOUNTER — OFFICE VISIT (OUTPATIENT)
Dept: DERMATOLOGY | Facility: CLINIC | Age: 48
End: 2023-10-03
Payer: COMMERCIAL

## 2023-10-03 VITALS — BODY MASS INDEX: 37.8 KG/M2 | HEIGHT: 70 IN | WEIGHT: 264 LBS

## 2023-10-03 DIAGNOSIS — D22.9 MULTIPLE BENIGN NEVI: ICD-10-CM

## 2023-10-03 DIAGNOSIS — L81.4 SOLAR LENTIGO: ICD-10-CM

## 2023-10-03 DIAGNOSIS — L30.4 INTERTRIGO: Primary | ICD-10-CM

## 2023-10-03 DIAGNOSIS — Z86.018 HISTORY OF DYSPLASTIC NEVUS: ICD-10-CM

## 2023-10-03 DIAGNOSIS — L57.8 OTHER SKIN CHANGES DUE TO CHRONIC EXPOSURE TO NONIONIZING RADIATION: ICD-10-CM

## 2023-10-03 DIAGNOSIS — D18.01 CHERRY ANGIOMA: ICD-10-CM

## 2023-10-03 DIAGNOSIS — L82.1 SEBORRHEIC KERATOSES: ICD-10-CM

## 2023-10-03 PROCEDURE — 1159F MED LIST DOCD IN RCRD: CPT | Mod: CPTII,S$GLB,, | Performed by: DERMATOLOGY

## 2023-10-03 PROCEDURE — 1160F RVW MEDS BY RX/DR IN RCRD: CPT | Mod: CPTII,S$GLB,, | Performed by: DERMATOLOGY

## 2023-10-03 PROCEDURE — 3008F BODY MASS INDEX DOCD: CPT | Mod: CPTII,S$GLB,, | Performed by: DERMATOLOGY

## 2023-10-03 PROCEDURE — 99203 OFFICE O/P NEW LOW 30 MIN: CPT | Mod: S$GLB,,, | Performed by: DERMATOLOGY

## 2023-10-03 PROCEDURE — 3044F HG A1C LEVEL LT 7.0%: CPT | Mod: CPTII,S$GLB,, | Performed by: DERMATOLOGY

## 2023-10-03 PROCEDURE — 1160F PR REVIEW ALL MEDS BY PRESCRIBER/CLIN PHARMACIST DOCUMENTED: ICD-10-PCS | Mod: CPTII,S$GLB,, | Performed by: DERMATOLOGY

## 2023-10-03 PROCEDURE — 3008F PR BODY MASS INDEX (BMI) DOCUMENTED: ICD-10-PCS | Mod: CPTII,S$GLB,, | Performed by: DERMATOLOGY

## 2023-10-03 PROCEDURE — 1159F PR MEDICATION LIST DOCUMENTED IN MEDICAL RECORD: ICD-10-PCS | Mod: CPTII,S$GLB,, | Performed by: DERMATOLOGY

## 2023-10-03 PROCEDURE — 3044F PR MOST RECENT HEMOGLOBIN A1C LEVEL <7.0%: ICD-10-PCS | Mod: CPTII,S$GLB,, | Performed by: DERMATOLOGY

## 2023-10-03 PROCEDURE — 99203 PR OFFICE/OUTPT VISIT, NEW, LEVL III, 30-44 MIN: ICD-10-PCS | Mod: S$GLB,,, | Performed by: DERMATOLOGY

## 2023-10-03 RX ORDER — KETOCONAZOLE 20 MG/G
CREAM TOPICAL
Qty: 60 G | Refills: 3 | Status: SHIPPED | OUTPATIENT
Start: 2023-10-03 | End: 2024-01-18

## 2023-10-03 RX ORDER — FLUCONAZOLE 200 MG/1
TABLET ORAL
Qty: 4 TABLET | Refills: 0 | Status: SHIPPED | OUTPATIENT
Start: 2023-10-03 | End: 2024-01-18

## 2023-10-03 NOTE — PROGRESS NOTES
Subjective:      Patient ID:  Matilde Redding is a 48 y.o. female who presents for   Chief Complaint   Patient presents with    Rash     Breast, abdomen      New Patient  Seen by Dr LEIGH 2019    Patient here today for rash under breast and to abdomen x 12 month  Pt states she's used Desitin, no resolution. Pt feels it's worse.  Pt states rash present with pain, and itches.     Derm HX:  Denies Phx of NMSC  H/o DN left thigh in high school, excised  Tanning bed use in youth  Denies Fhx of MM    Current Outpatient Medications:   ·  amLODIPine (NORVASC) 5 MG tablet, Take 1 tablet (5 mg total) by mouth once daily., Disp: 90 tablet, Rfl: 3  ·  buPROPion (WELLBUTRIN XL) 300 MG 24 hr tablet, Take 1 tablet (300 mg total) by mouth once daily., Disp: 90 tablet, Rfl: 3  ·  cetirizine (ZYRTEC) 10 MG tablet, Take 10 mg by mouth once daily., Disp: , Rfl:   ·  norethin-e.estradiol triphasic (ARANELLE, 28,) 0.5/1/0.5-35 mg-mcg Tab, , Disp: , Rfl:   ·  pantoprazole (PROTONIX) 40 MG tablet, Take 1 tablet (40 mg total) by mouth once daily., Disp: 30 tablet, Rfl: 3        Review of Systems   Constitutional:  Negative for fever, chills and fatigue.   Skin:  Negative for daily sunscreen use (sun avoidance), activity-related sunscreen use and wears hat.   Hematologic/Lymphatic: Does not bruise/bleed easily.       Objective:   Physical Exam   Constitutional: She appears well-developed and well-nourished. No distress.   HENT:   Mouth/Throat: Lips normal.    Eyes: Lids are normal.  No conjunctival no injection.   Cardiovascular:  There is no local extremity swelling and no dependent edema.             Neurological: She is alert and oriented to person, place, and time. She is not disoriented.   Psychiatric: She has a normal mood and affect.   Skin:   Areas Examined (abnormalities noted in diagram):   Scalp / Hair Palpated and Inspected  Head / Face Inspection Performed  Neck Inspection Performed  Chest / Axilla Inspection Performed  Abdomen  Inspection Performed  Back Inspection Performed  RUE Inspected  LUE Inspection Performed  Nails and Digits Inspection Performed                 Diagram Legend     Erythematous scaling macule/papule c/w actinic keratosis       Vascular papule c/w angioma      Pigmented verrucoid papule/plaque c/w seborrheic keratosis      Yellow umbilicated papule c/w sebaceous hyperplasia      Irregularly shaped tan macule c/w lentigo     1-2 mm smooth white papules consistent with Milia      Movable subcutaneous cyst with punctum c/w epidermal inclusion cyst      Subcutaneous movable cyst c/w pilar cyst      Firm pink to brown papule c/w dermatofibroma      Pedunculated fleshy papule(s) c/w skin tag(s)      Evenly pigmented macule c/w junctional nevus     Mildly variegated pigmented, slightly irregular-bordered macule c/w mildly atypical nevus      Flesh colored to evenly pigmented papule c/w intradermal nevus       Pink pearly papule/plaque c/w basal cell carcinoma      Erythematous hyperkeratotic cursted plaque c/w SCC      Surgical scar with no sign of skin cancer recurrence      Open and closed comedones      Inflammatory papules and pustules      Verrucoid papule consistent consistent with wart     Erythematous eczematous patches and plaques     Dystrophic onycholytic nail with subungual debris c/w onychomycosis     Umbilicated papule    Erythematous-base heme-crusted tan verrucoid plaque consistent with inflamed seborrheic keratosis     Erythematous Silvery Scaling Plaque c/w Psoriasis     See annotation      Assessment / Plan:        Intertrigo  -     fluconazole (DIFLUCAN) 200 MG Tab; 1 po biw x 2 weeks  Dispense: 4 tablet; Refill: 0  -     ketoconazole (NIZORAL) 2 % cream; AAA under breasts daily to bid PRN rash  Dispense: 60 g; Refill: 3  Recommend white vinegar: water 1:2 compresses 2x/day followed by cool blow dry and then application of prescription medication.     Cool blow dry after showering. Once clear, use Zeasorb  AF powder for maintenance to affected area.  Zinc diaper rash ok to use as skin barrier     Multiple benign nevi  Careful dermoscopy evaluation of nevi performed with none identified as needing biopsy today  Monitor for new mole or moles that are becoming bigger, darker, irritated, or developing irregular borders.     Seborrheic keratoses  These are benign inherited growths without a malignant potential. Reassurance given to patient. No treatment is necessary.     Solar lentigo  This is a benign hyperpigmented sun induced lesion. Daily sun protection will reduce the number of new lesions. Treatment of these benign lesions are considered cosmetic.    Cherry angioma  This is a benign vascular lesion. Reassurance given. No treatment required.     Other skin changes due to chronic exposure to nonionizing radiation  Patient instructed in importance in daily broad spectrum sun protection of at least spf 30. Mineral sunscreen ingredients preferred (Zinc +/- Titanium) and can be found OTC.   Recommend Elta MD for daily use on face and neck.  Patient encouraged to wear hat for all outdoor exposure.   Also discussed sun avoidance and use of protective clothing.    History of dysplastic nevus  UBSE today per patient's preference, states scar well healed, no pigment recurrence             No follow-ups on file.

## 2023-10-05 ENCOUNTER — PATIENT MESSAGE (OUTPATIENT)
Dept: DERMATOLOGY | Facility: CLINIC | Age: 48
End: 2023-10-05
Payer: COMMERCIAL

## 2023-11-13 ENCOUNTER — OFFICE VISIT (OUTPATIENT)
Dept: URGENT CARE | Facility: CLINIC | Age: 48
End: 2023-11-13
Payer: COMMERCIAL

## 2023-11-13 VITALS
RESPIRATION RATE: 16 BRPM | HEART RATE: 93 BPM | OXYGEN SATURATION: 98 % | BODY MASS INDEX: 37.75 KG/M2 | TEMPERATURE: 98 F | WEIGHT: 269.63 LBS | HEIGHT: 71 IN | SYSTOLIC BLOOD PRESSURE: 135 MMHG | DIASTOLIC BLOOD PRESSURE: 89 MMHG

## 2023-11-13 DIAGNOSIS — J02.9 PHARYNGITIS, UNSPECIFIED ETIOLOGY: ICD-10-CM

## 2023-11-13 DIAGNOSIS — J34.9 SINUS PROBLEM: ICD-10-CM

## 2023-11-13 DIAGNOSIS — J01.00 ACUTE NON-RECURRENT MAXILLARY SINUSITIS: Primary | ICD-10-CM

## 2023-11-13 LAB
CTP QC/QA: YES
CTP QC/QA: YES
FLUAV AG NPH QL: NEGATIVE
FLUBV AG NPH QL: NEGATIVE
SARS-COV-2 AG RESP QL IA.RAPID: NEGATIVE

## 2023-11-13 PROCEDURE — 99204 OFFICE O/P NEW MOD 45 MIN: CPT | Mod: S$GLB,,, | Performed by: PHYSICIAN ASSISTANT

## 2023-11-13 PROCEDURE — 87804 POCT INFLUENZA A/B: ICD-10-PCS | Mod: QW,,, | Performed by: PHYSICIAN ASSISTANT

## 2023-11-13 PROCEDURE — 87811 SARS CORONAVIRUS 2 ANTIGEN POCT, MANUAL READ: ICD-10-PCS | Mod: QW,S$GLB,, | Performed by: PHYSICIAN ASSISTANT

## 2023-11-13 PROCEDURE — 99204 PR OFFICE/OUTPT VISIT, NEW, LEVL IV, 45-59 MIN: ICD-10-PCS | Mod: S$GLB,,, | Performed by: PHYSICIAN ASSISTANT

## 2023-11-13 PROCEDURE — 87804 INFLUENZA ASSAY W/OPTIC: CPT | Mod: QW,,, | Performed by: PHYSICIAN ASSISTANT

## 2023-11-13 PROCEDURE — 87811 SARS-COV-2 COVID19 W/OPTIC: CPT | Mod: QW,S$GLB,, | Performed by: PHYSICIAN ASSISTANT

## 2023-11-13 RX ORDER — GUAIFENESIN 100 MG/5ML
200 SOLUTION ORAL 3 TIMES DAILY PRN
Qty: 236 ML | Refills: 0 | Status: SHIPPED | OUTPATIENT
Start: 2023-11-13 | End: 2023-11-23

## 2023-11-13 RX ORDER — LORATADINE 10 MG/1
10 TABLET ORAL DAILY
Qty: 30 TABLET | Refills: 0 | Status: SHIPPED | OUTPATIENT
Start: 2023-11-13 | End: 2024-01-18

## 2023-11-13 RX ORDER — FLUTICASONE PROPIONATE 50 MCG
1 SPRAY, SUSPENSION (ML) NASAL DAILY
Qty: 9.9 ML | Refills: 0 | Status: SHIPPED | OUTPATIENT
Start: 2023-11-13 | End: 2024-01-18

## 2023-11-13 RX ORDER — AMOXICILLIN AND CLAVULANATE POTASSIUM 875; 125 MG/1; MG/1
1 TABLET, FILM COATED ORAL EVERY 12 HOURS
Qty: 10 TABLET | Refills: 0 | Status: SHIPPED | OUTPATIENT
Start: 2023-11-13 | End: 2024-01-18

## 2023-11-13 NOTE — PATIENT INSTRUCTIONS
Take Tylenol or Motrin as needed.  Take prescribed medications.  Drink plenty of fluids.  Follow up closely with your primary care provider.  Return for any new or worsening symptoms.

## 2023-11-13 NOTE — LETTER
November 13, 2023      [facility]  [address]  [facility] [address]  Phone: [phone]       Patient: Matilde Redding   YOB: 1975  Date of Visit: 11/13/2023    To Whom It May Concern:    Annabelle Redding  was at [facility] on 11/13/2023. The patient may return to work/school on 11/17/2023 with no restrictions. If you have any questions or concerns, or if I can be of further assistance, please do not hesitate to contact me.    Sincerely,    Ksenia Peralta PA-C

## 2023-11-13 NOTE — PROGRESS NOTES
"Subjective:      Patient ID: Matilde Redding is a 48 y.o. female.    Vitals:  height is 5' 11" (1.803 m) and weight is 122.3 kg (269 lb 9.6 oz). Her oral temperature is 97.7 °F (36.5 °C). Her blood pressure is 135/89 and her pulse is 93. Her respiration is 16 and oxygen saturation is 98%.     Chief Complaint: Sinus Problem    Patient is a 48-year-old female who presents with congestion for 4 days.  She has past medical history significant for hypertension and acute bronchitis.  She reports associated cough, ear pain, headache, hoarse voice, sinus pressure, sneezing and sore throat.  She is been taking Tylenol with little improvement.                  Constitution: Negative for chills and fever.   HENT:  Positive for ear pain, congestion, sinus pain, sinus pressure and sore throat. Negative for ear discharge.    Respiratory:  Positive for cough.    Gastrointestinal:  Negative for abdominal pain, nausea, vomiting and diarrhea.   Neurological:  Positive for headaches.      Objective:     Physical Exam   Constitutional: She appears well-developed.   HENT:   Head: Normocephalic and atraumatic.   Ears:   Right Ear: Tympanic membrane, external ear and ear canal normal.   Left Ear: Tympanic membrane, external ear and ear canal normal.   Nose: Right sinus exhibits maxillary sinus tenderness. Left sinus exhibits maxillary sinus tenderness.   Mouth/Throat: Uvula is midline. Mucous membranes are moist. Posterior oropharyngeal erythema present.   Eyes: Conjunctivae are normal. Right eye exhibits no discharge. Left eye exhibits no discharge.   Cardiovascular: Normal rate and normal heart sounds.   Pulmonary/Chest: Effort normal and breath sounds normal. No stridor. No respiratory distress. She has no decreased breath sounds.   Abdominal: Normal appearance.   Neurological: She is alert.   Nursing note and vitals reviewed.      Assessment:     1. Acute non-recurrent maxillary sinusitis    2. Sinus problem    3. Pharyngitis, " unspecified etiology        Plan:       Acute non-recurrent maxillary sinusitis  -     amoxicillin-clavulanate 875-125mg (AUGMENTIN) 875-125 mg per tablet; Take 1 tablet by mouth every 12 (twelve) hours.  Dispense: 10 tablet; Refill: 0  -     fluticasone propionate (FLONASE) 50 mcg/actuation nasal spray; 1 spray (50 mcg total) by Each Nostril route once daily.  Dispense: 9.9 mL; Refill: 0  -     loratadine (CLARITIN) 10 mg tablet; Take 1 tablet (10 mg total) by mouth once daily.  Dispense: 30 tablet; Refill: 0  -     guaiFENesin 100 mg/5 ml (ROBITUSSIN) 100 mg/5 mL syrup; Take 10 mLs (200 mg total) by mouth 3 (three) times daily as needed for Cough or Congestion.  Dispense: 236 mL; Refill: 0    Sinus problem  -     SARS Coronavirus 2 Antigen, POCT Manual Read  -     POCT Influenza A/B Rapid Antigen    Pharyngitis, unspecified etiology  -     amoxicillin-clavulanate 875-125mg (AUGMENTIN) 875-125 mg per tablet; Take 1 tablet by mouth every 12 (twelve) hours.  Dispense: 10 tablet; Refill: 0          Medical Decision Making:   History:   Old Medical Records: I decided to obtain old medical records.  Clinical Tests:   Lab Tests: Reviewed and Ordered  Urgent Care Management:  Urgent evaluation of a well appearing 48 year old female who presents with sore throat, cough, congestion and sinus pressure. Vital signs are stable. Clear and equal breath sounds bilaterally. Oxygen saturation is stable. I doubt pneumonia. No sign of otitis media. Denied GI complaints. She has sinus tenderness to palpation. Symptoms have been present for a week. Reports cough now productive. Patient is noted to be COVID and influenza negative. Will treat for acute sinusitis. Discussed this will also cover strep. Discussed results with patient. Return precautions given. Based on my clinical evaluation, I do not appreciate any immediate, emergent, or life threatening condition or etiology that warrants additional workup today and feel that the patient  can be discharged with close follow up care.  Patient is to follow up with their primary care provider. All questions answered.

## 2023-11-13 NOTE — PROGRESS NOTES
"Subjective:      Patient ID: Matilde Redding is a 48 y.o. female.    Vitals:  height is 5' 11" (1.803 m) and weight is 122.3 kg (269 lb 9.6 oz). Her oral temperature is 97.7 °F (36.5 °C). Her blood pressure is 135/89 and her pulse is 93. Her respiration is 16 and oxygen saturation is 98%.     Chief Complaint: Sinus Problem    Sinus Problem  This is a new problem. Episode onset: 4 days ago. The problem has been gradually worsening since onset. Associated symptoms include congestion, coughing, ear pain, headaches, a hoarse voice, sinus pressure, sneezing and a sore throat. (Body aches) Past treatments include acetaminophen. The treatment provided no relief.       HENT:  Positive for ear pain, congestion, sinus pressure and sore throat.    Respiratory:  Positive for cough.    Allergic/Immunologic: Positive for sneezing.   Neurological:  Positive for headaches.      Objective:     Physical Exam    Assessment:     1. Sinus problem        Plan:       Sinus problem  -     SARS Coronavirus 2 Antigen, POCT Manual Read  -     POCT Influenza A/B Rapid Antigen                    "

## 2023-11-26 ENCOUNTER — PATIENT MESSAGE (OUTPATIENT)
Dept: PRIMARY CARE CLINIC | Facility: CLINIC | Age: 48
End: 2023-11-26
Payer: COMMERCIAL

## 2023-12-28 ENCOUNTER — PATIENT MESSAGE (OUTPATIENT)
Dept: PRIMARY CARE CLINIC | Facility: CLINIC | Age: 48
End: 2023-12-28
Payer: COMMERCIAL

## 2024-01-09 ENCOUNTER — PATIENT MESSAGE (OUTPATIENT)
Dept: PRIMARY CARE CLINIC | Facility: CLINIC | Age: 49
End: 2024-01-09
Payer: COMMERCIAL

## 2024-01-09 DIAGNOSIS — Z00.00 ROUTINE MEDICAL EXAM: Primary | ICD-10-CM

## 2024-01-12 ENCOUNTER — TELEPHONE (OUTPATIENT)
Dept: DERMATOLOGY | Facility: CLINIC | Age: 49
End: 2024-01-12
Payer: COMMERCIAL

## 2024-01-12 ENCOUNTER — PATIENT MESSAGE (OUTPATIENT)
Dept: DERMATOLOGY | Facility: CLINIC | Age: 49
End: 2024-01-12
Payer: COMMERCIAL

## 2024-01-12 NOTE — TELEPHONE ENCOUNTER
----- Message from Cayla Hester sent at 1/12/2024  2:14 PM CST -----  Regarding: Sooner Appointment Request  Contact: patient at [phone]  Type:  Sooner Appointment Request    Name of Caller:  patient at [phone]    When is the first available appointment?  03/18/2024  Symptoms:  Boil on upper part of right breast    Additional Information:  Please call and advise. Thank you

## 2024-01-16 ENCOUNTER — LAB VISIT (OUTPATIENT)
Dept: LAB | Facility: HOSPITAL | Age: 49
End: 2024-01-16
Attending: INTERNAL MEDICINE
Payer: COMMERCIAL

## 2024-01-16 DIAGNOSIS — Z00.00 ROUTINE MEDICAL EXAM: ICD-10-CM

## 2024-01-16 LAB
ALBUMIN SERPL BCP-MCNC: 2.9 G/DL (ref 3.5–5.2)
ALP SERPL-CCNC: 81 U/L (ref 55–135)
ALT SERPL W/O P-5'-P-CCNC: 13 U/L (ref 10–44)
ANION GAP SERPL CALC-SCNC: 8 MMOL/L (ref 8–16)
AST SERPL-CCNC: 13 U/L (ref 10–40)
BASOPHILS # BLD AUTO: 0.1 K/UL (ref 0–0.2)
BASOPHILS NFR BLD: 0.9 % (ref 0–1.9)
BILIRUB SERPL-MCNC: 0.4 MG/DL (ref 0.1–1)
BUN SERPL-MCNC: 10 MG/DL (ref 6–20)
CALCIUM SERPL-MCNC: 8.5 MG/DL (ref 8.7–10.5)
CHLORIDE SERPL-SCNC: 106 MMOL/L (ref 95–110)
CHOLEST SERPL-MCNC: 206 MG/DL (ref 120–199)
CHOLEST/HDLC SERPL: 4 {RATIO} (ref 2–5)
CO2 SERPL-SCNC: 25 MMOL/L (ref 23–29)
CREAT SERPL-MCNC: 0.8 MG/DL (ref 0.5–1.4)
DIFFERENTIAL METHOD BLD: NORMAL
EOSINOPHIL # BLD AUTO: 0.2 K/UL (ref 0–0.5)
EOSINOPHIL NFR BLD: 1.8 % (ref 0–8)
ERYTHROCYTE [DISTWIDTH] IN BLOOD BY AUTOMATED COUNT: 13.5 % (ref 11.5–14.5)
EST. GFR  (NO RACE VARIABLE): >60 ML/MIN/1.73 M^2
GLUCOSE SERPL-MCNC: 87 MG/DL (ref 70–110)
HCT VFR BLD AUTO: 40 % (ref 37–48.5)
HDLC SERPL-MCNC: 52 MG/DL (ref 40–75)
HDLC SERPL: 25.2 % (ref 20–50)
HGB BLD-MCNC: 13.4 G/DL (ref 12–16)
IMM GRANULOCYTES # BLD AUTO: 0.03 K/UL (ref 0–0.04)
IMM GRANULOCYTES NFR BLD AUTO: 0.3 % (ref 0–0.5)
LDLC SERPL CALC-MCNC: 124 MG/DL (ref 63–159)
LYMPHOCYTES # BLD AUTO: 4.5 K/UL (ref 1–4.8)
LYMPHOCYTES NFR BLD: 40.5 % (ref 18–48)
MCH RBC QN AUTO: 28.4 PG (ref 27–31)
MCHC RBC AUTO-ENTMCNC: 33.5 G/DL (ref 32–36)
MCV RBC AUTO: 85 FL (ref 82–98)
MONOCYTES # BLD AUTO: 0.6 K/UL (ref 0.3–1)
MONOCYTES NFR BLD: 5.1 % (ref 4–15)
NEUTROPHILS # BLD AUTO: 5.7 K/UL (ref 1.8–7.7)
NEUTROPHILS NFR BLD: 51.4 % (ref 38–73)
NONHDLC SERPL-MCNC: 154 MG/DL
NRBC BLD-RTO: 0 /100 WBC
PLATELET # BLD AUTO: 421 K/UL (ref 150–450)
PMV BLD AUTO: 9.3 FL (ref 9.2–12.9)
POTASSIUM SERPL-SCNC: 3.4 MMOL/L (ref 3.5–5.1)
PROT SERPL-MCNC: 6.6 G/DL (ref 6–8.4)
RBC # BLD AUTO: 4.72 M/UL (ref 4–5.4)
SODIUM SERPL-SCNC: 139 MMOL/L (ref 136–145)
TRIGL SERPL-MCNC: 150 MG/DL (ref 30–150)
WBC # BLD AUTO: 11.08 K/UL (ref 3.9–12.7)

## 2024-01-16 PROCEDURE — 80053 COMPREHEN METABOLIC PANEL: CPT | Performed by: INTERNAL MEDICINE

## 2024-01-16 PROCEDURE — 80061 LIPID PANEL: CPT | Performed by: INTERNAL MEDICINE

## 2024-01-16 PROCEDURE — 85025 COMPLETE CBC W/AUTO DIFF WBC: CPT | Performed by: INTERNAL MEDICINE

## 2024-01-16 PROCEDURE — 36415 COLL VENOUS BLD VENIPUNCTURE: CPT | Mod: PO | Performed by: INTERNAL MEDICINE

## 2024-01-18 ENCOUNTER — OFFICE VISIT (OUTPATIENT)
Dept: PRIMARY CARE CLINIC | Facility: CLINIC | Age: 49
End: 2024-01-18
Payer: COMMERCIAL

## 2024-01-18 VITALS
HEART RATE: 74 BPM | TEMPERATURE: 98 F | SYSTOLIC BLOOD PRESSURE: 132 MMHG | WEIGHT: 269.63 LBS | RESPIRATION RATE: 18 BRPM | DIASTOLIC BLOOD PRESSURE: 84 MMHG | OXYGEN SATURATION: 98 % | HEIGHT: 71 IN | BODY MASS INDEX: 37.75 KG/M2

## 2024-01-18 DIAGNOSIS — K30 ACID INDIGESTION: ICD-10-CM

## 2024-01-18 DIAGNOSIS — Z00.00 ANNUAL PHYSICAL EXAM: ICD-10-CM

## 2024-01-18 DIAGNOSIS — E66.01 SEVERE OBESITY (BMI 35.0-39.9) WITH COMORBIDITY: Primary | ICD-10-CM

## 2024-01-18 PROCEDURE — 3008F BODY MASS INDEX DOCD: CPT | Mod: CPTII,S$GLB,, | Performed by: INTERNAL MEDICINE

## 2024-01-18 PROCEDURE — 3079F DIAST BP 80-89 MM HG: CPT | Mod: CPTII,S$GLB,, | Performed by: INTERNAL MEDICINE

## 2024-01-18 PROCEDURE — 99396 PREV VISIT EST AGE 40-64: CPT | Mod: S$GLB,,, | Performed by: INTERNAL MEDICINE

## 2024-01-18 PROCEDURE — 1160F RVW MEDS BY RX/DR IN RCRD: CPT | Mod: CPTII,S$GLB,, | Performed by: INTERNAL MEDICINE

## 2024-01-18 PROCEDURE — 99999 PR PBB SHADOW E&M-EST. PATIENT-LVL IV: CPT | Mod: PBBFAC,,, | Performed by: INTERNAL MEDICINE

## 2024-01-18 PROCEDURE — 1159F MED LIST DOCD IN RCRD: CPT | Mod: CPTII,S$GLB,, | Performed by: INTERNAL MEDICINE

## 2024-01-18 PROCEDURE — 3075F SYST BP GE 130 - 139MM HG: CPT | Mod: CPTII,S$GLB,, | Performed by: INTERNAL MEDICINE

## 2024-01-18 RX ORDER — PANTOPRAZOLE SODIUM 40 MG/1
40 TABLET, DELAYED RELEASE ORAL DAILY
Qty: 30 TABLET | Refills: 3 | Status: SHIPPED | OUTPATIENT
Start: 2024-01-18 | End: 2025-01-17

## 2024-01-18 NOTE — PROGRESS NOTES
[facility] Note    Chief Complaint      Chief Complaint   Patient presents with    Annual Exam       History of Present Illness      Matilde Redding is a 48 y.o. female who presents today for   Chief Complaint   Patient presents with    Annual Exam   .  Patient comes to appointment here for annual preventative visit with me . She had full screening labs . She is complaint with all meds . She still complains of her cough that has been chronic . States it was better on the   Protonix .  Problem List Items Addressed This Visit          Endocrine    Severe obesity (BMI 35.0-39.9) with comorbidity - Primary       Other    Annual physical exam    Overview     pe documented all labs reviewed with patient today            Other Visit Diagnoses       Acid indigestion                  Past Medical History:  History reviewed. No pertinent past medical history.    Past Surgical History:  Past Surgical History:   Procedure Laterality Date    HYSTERECTOMY  6/2017    Ovary removed and fallopian tubes removed    PARTIAL HYSTERECTOMY  2017    WISDOM TOOTH EXTRACTION         Family History:  family history includes Cancer in her father and mother; Heart disease in her father; Hypertension in her father and mother.    Social History:  Social History     Socioeconomic History    Marital status:    Tobacco Use    Smoking status: Never    Smokeless tobacco: Never   Substance and Sexual Activity    Alcohol use: Yes     Alcohol/week: 2.0 standard drinks of alcohol     Types: 1 Glasses of wine, 1 Cans of beer per week    Drug use: Never    Sexual activity: Not Currently     Partners: Male     Birth control/protection: Other-see comments     Comment: Birth control pill     Social Determinants of Health     Financial Resource Strain: Low Risk  (9/29/2023)    Overall Financial Resource Strain (CARDIA)     Difficulty of Paying Living Expenses: Not very hard   Food Insecurity: No Food Insecurity (9/29/2023)     Hunger Vital Sign     Worried About Running Out of Food in the Last Year: Never true     Ran Out of Food in the Last Year: Never true   Transportation Needs: No Transportation Needs (9/29/2023)    PRAPARE - Transportation     Lack of Transportation (Medical): No     Lack of Transportation (Non-Medical): No   Physical Activity: Inactive (9/29/2023)    Exercise Vital Sign     Days of Exercise per Week: 0 days     Minutes of Exercise per Session: 0 min   Stress: No Stress Concern Present (9/29/2023)    [employer] [place] [employer] - Occupational Stress Questionnaire     Feeling of Stress : Not at all   Social Connections: Unknown (9/29/2023)    Social Connection and Isolation Panel [NHANES]     Frequency of Communication with Friends and Family: Twice a week     Frequency of Social Gatherings with Friends and Family: Once a week     Active Member of Clubs or Organizations: No     Attends Club or Organization Meetings: Never     Marital Status:    Housing Stability: Low Risk  (9/29/2023)    Housing Stability Vital Sign     Unable to Pay for Housing in the Last Year: No     Number of Places Lived in the Last Year: 1     Unstable Housing in the Last Year: No       Review of Systems:   Review of Systems   Constitutional:  Negative for fever and weight loss.   HENT:  Negative for congestion, hearing loss and sore throat.    Eyes:  Negative for blurred vision.   Respiratory:  Negative for cough and shortness of breath.    Cardiovascular:  Negative for chest pain, palpitations, claudication and leg swelling.   Gastrointestinal:  Negative for abdominal pain, constipation, diarrhea and heartburn.   Genitourinary:  Negative for dysuria.   Musculoskeletal:  Negative for back pain and myalgias.   Skin:  Negative for rash.   Neurological:  Negative for focal weakness, seizures and headaches.   Psychiatric/Behavioral:  Negative for depression and suicidal ideas. The patient is not nervous/anxious.           Medications:  Outpatient Encounter Medications as of 1/18/2024   Medication Sig Dispense Refill    amLODIPine (NORVASC) 5 MG tablet TAKE 1 TABLET(5 MG) BY MOUTH EVERY DAY 90 tablet 0    buPROPion (WELLBUTRIN XL) 300 MG 24 hr tablet Take 1 tablet (300 mg total) by mouth once daily. 90 tablet 3    norethin-e.estradiol triphasic (ARANELLE, 28,) 0.5/1/0.5-35 mg-mcg Tab       pantoprazole (PROTONIX) 40 MG tablet Take 1 tablet (40 mg total) by mouth once daily. 30 tablet 3    [DISCONTINUED] amLODIPine (NORVASC) 5 MG tablet Take 1 tablet (5 mg total) by mouth once daily. 90 tablet 3    [DISCONTINUED] amoxicillin-clavulanate 875-125mg (AUGMENTIN) 875-125 mg per tablet Take 1 tablet by mouth every 12 (twelve) hours. 10 tablet 0    [DISCONTINUED] fluconazole (DIFLUCAN) 200 MG Tab 1 po biw x 2 weeks (Patient not taking: Reported on 11/13/2023) 4 tablet 0    [DISCONTINUED] fluticasone propionate (FLONASE) 50 mcg/actuation nasal spray 1 spray (50 mcg total) by Each Nostril route once daily. 9.9 mL 0    [DISCONTINUED] ketoconazole (NIZORAL) 2 % cream AAA under breasts daily to bid PRN rash (Patient not taking: Reported on 11/13/2023) 60 g 3    [DISCONTINUED] loratadine (CLARITIN) 10 mg tablet Take 1 tablet (10 mg total) by mouth once daily. 30 tablet 0    [DISCONTINUED] pantoprazole (PROTONIX) 40 MG tablet Take 1 tablet (40 mg total) by mouth once daily. 30 tablet 3     No facility-administered encounter medications on file as of 1/18/2024.        Allergies:  Review of patient's allergies indicates:  No Known Allergies      Physical Exam         Vitals:    01/18/24 1502   BP: 132/84   Pulse:    Resp:    Temp:          Physical Exam  Constitutional:       Appearance: She is well-developed.   Eyes:      Pupils: Pupils are equal, round, and reactive to light.   Neck:      Thyroid: No thyromegaly.   Cardiovascular:      Rate and Rhythm: Normal rate.      Heart sounds: Normal heart sounds. No murmur heard.     No friction rub.  "No gallop.   Pulmonary:      Breath sounds: Normal breath sounds.   Abdominal:      General: Bowel sounds are normal.      Palpations: Abdomen is soft.   Musculoskeletal:         General: Normal range of motion.      Cervical back: Normal range of motion.   Lymphadenopathy:      Cervical: No cervical adenopathy.   Skin:     General: Skin is warm.      Findings: No rash.   Neurological:      Mental Status: She is alert and oriented to person, place, and time.      Cranial Nerves: No cranial nerve deficit.   Psychiatric:         Behavior: Behavior normal.          Laboratory:  CBC:  Recent Labs   Lab Result Units 01/16/24  1201   WBC K/uL 11.08   RBC M/uL 4.72   Hemoglobin g/dL 13.4   Hematocrit % 40.0   Platelets K/uL 421   MCV fL 85   MCH pg 28.4   MCHC g/dL 33.5     CMP:  Recent Labs   Lab Result Units 01/16/24  1201   Glucose mg/dL 87   Calcium mg/dL 8.5*   Albumin g/dL 2.9*   Total Protein g/dL 6.6   Sodium mmol/L 139   Potassium mmol/L 3.4*   CO2 mmol/L 25   Chloride mmol/L 106   BUN mg/dL 10   Alkaline Phosphatase U/L 81   ALT U/L 13   AST U/L 13   Total Bilirubin mg/dL 0.4     URINALYSIS:  No results for input(s): "COLORU", "CLARITYU", "SPECGRAV", "PHUR", "PROTEINUA", "GLUCOSEU", "BILIRUBINCON", "BLOODU", "WBCU", "RBCU", "BACTERIA", "MUCUS", "NITRITE", "LEUKOCYTESUR", "UROBILINOGEN", "HYALINECASTS" in the last 2160 hours.   LIPIDS:  Recent Labs   Lab Result Units 01/16/24  1201   HDL mg/dL 52   Cholesterol mg/dL 206*   Triglycerides mg/dL 150   LDL Cholesterol mg/dL 124.0   HDL/Cholesterol Ratio % 25.2   Non-HDL Cholesterol mg/dL 154   Total Cholesterol/HDL Ratio  4.0     TSH:  No results for input(s): "TSH" in the last 2160 hours.  A1C:  No results for input(s): "HGBA1C" in the last 2160 hours.    Radiology:        Assessment:     Matilde Redding is a 48 y.o.female with:    Severe obesity (BMI 35.0-39.9) with comorbidity    Annual physical exam    Acid indigestion  -     pantoprazole (PROTONIX) 40 MG tablet; " Take 1 tablet (40 mg total) by mouth once daily.  Dispense: 30 tablet; Refill: 3          Plan:     Problem List Items Addressed This Visit          Endocrine    Severe obesity (BMI 35.0-39.9) with comorbidity - Primary       Other    Annual physical exam    Overview     pe documented all labs reviewed with patient today            Other Visit Diagnoses       Acid indigestion                As above, continue current medications and maintain follow up with specialists.  Return to clinic in 6 months.      Frederick W Dantagnan [facility]

## 2024-01-22 ENCOUNTER — HOSPITAL ENCOUNTER (EMERGENCY)
Facility: HOSPITAL | Age: 49
Discharge: HOME | End: 2024-01-22
Attending: STUDENT IN AN ORGANIZED HEALTH CARE EDUCATION/TRAINING PROGRAM
Payer: COMMERCIAL

## 2024-01-22 VITALS
TEMPERATURE: 98 F | HEIGHT: 71 IN | BODY MASS INDEX: 37.8 KG/M2 | HEART RATE: 85 BPM | OXYGEN SATURATION: 98 % | RESPIRATION RATE: 16 BRPM | SYSTOLIC BLOOD PRESSURE: 138 MMHG | WEIGHT: 270 LBS | DIASTOLIC BLOOD PRESSURE: 83 MMHG

## 2024-01-22 DIAGNOSIS — D25.9 UTERINE LEIOMYOMA, UNSPECIFIED LOCATION: ICD-10-CM

## 2024-01-22 DIAGNOSIS — R10.30 LOWER ABDOMINAL PAIN: Primary | ICD-10-CM

## 2024-01-22 DIAGNOSIS — R31.9 URINARY TRACT INFECTION WITH HEMATURIA, SITE UNSPECIFIED: ICD-10-CM

## 2024-01-22 DIAGNOSIS — N39.0 URINARY TRACT INFECTION WITH HEMATURIA, SITE UNSPECIFIED: ICD-10-CM

## 2024-01-22 DIAGNOSIS — K83.8 DILATED CBD, ACQUIRED: ICD-10-CM

## 2024-01-22 LAB
ALBUMIN SERPL BCP-MCNC: 3.3 G/DL (ref 3.5–5.2)
ALP SERPL-CCNC: 81 U/L (ref 55–135)
ALT SERPL W/O P-5'-P-CCNC: 13 U/L (ref 10–44)
ANION GAP SERPL CALC-SCNC: 10 MMOL/L (ref 8–16)
AST SERPL-CCNC: 10 U/L (ref 10–40)
B-HCG UR QL: NEGATIVE
BACTERIA #/AREA URNS HPF: ABNORMAL /HPF
BASOPHILS # BLD AUTO: 0.06 K/UL (ref 0–0.2)
BASOPHILS NFR BLD: 0.4 % (ref 0–1.9)
BILIRUB SERPL-MCNC: 0.5 MG/DL (ref 0.1–1)
BILIRUB UR QL STRIP: NEGATIVE
BUN SERPL-MCNC: 12 MG/DL (ref 6–20)
CALCIUM SERPL-MCNC: 8.7 MG/DL (ref 8.7–10.5)
CHLORIDE SERPL-SCNC: 103 MMOL/L (ref 95–110)
CLARITY UR: ABNORMAL
CO2 SERPL-SCNC: 22 MMOL/L (ref 23–29)
COLOR UR: YELLOW
CREAT SERPL-MCNC: 0.9 MG/DL (ref 0.5–1.4)
CTP QC/QA: YES
DIFFERENTIAL METHOD BLD: ABNORMAL
EOSINOPHIL # BLD AUTO: 0.1 K/UL (ref 0–0.5)
EOSINOPHIL NFR BLD: 0.6 % (ref 0–8)
ERYTHROCYTE [DISTWIDTH] IN BLOOD BY AUTOMATED COUNT: 13.6 % (ref 11.5–14.5)
EST. GFR  (NO RACE VARIABLE): >60 ML/MIN/1.73 M^2
GLUCOSE SERPL-MCNC: 99 MG/DL (ref 70–110)
GLUCOSE UR QL STRIP: NEGATIVE
HCT VFR BLD AUTO: 41.3 % (ref 37–48.5)
HGB BLD-MCNC: 13.8 G/DL (ref 12–16)
HGB UR QL STRIP: ABNORMAL
HYALINE CASTS #/AREA URNS LPF: 15 /LPF
IMM GRANULOCYTES # BLD AUTO: 0.03 K/UL (ref 0–0.04)
IMM GRANULOCYTES NFR BLD AUTO: 0.2 % (ref 0–0.5)
KETONES UR QL STRIP: ABNORMAL
LACTATE SERPL-SCNC: 1 MMOL/L (ref 0.5–2.2)
LEUKOCYTE ESTERASE UR QL STRIP: ABNORMAL
LIPASE SERPL-CCNC: 15 U/L (ref 4–60)
LYMPHOCYTES # BLD AUTO: 2.8 K/UL (ref 1–4.8)
LYMPHOCYTES NFR BLD: 19.8 % (ref 18–48)
MCH RBC QN AUTO: 28.3 PG (ref 27–31)
MCHC RBC AUTO-ENTMCNC: 33.4 G/DL (ref 32–36)
MCV RBC AUTO: 85 FL (ref 82–98)
MICROSCOPIC COMMENT: ABNORMAL
MONOCYTES # BLD AUTO: 0.7 K/UL (ref 0.3–1)
MONOCYTES NFR BLD: 4.9 % (ref 4–15)
NEUTROPHILS # BLD AUTO: 10.5 K/UL (ref 1.8–7.7)
NEUTROPHILS NFR BLD: 74.1 % (ref 38–73)
NITRITE UR QL STRIP: NEGATIVE
NRBC BLD-RTO: 0 /100 WBC
PH UR STRIP: 6 [PH] (ref 5–8)
PLATELET # BLD AUTO: 392 K/UL (ref 150–450)
PMV BLD AUTO: 8.8 FL (ref 9.2–12.9)
POTASSIUM SERPL-SCNC: 4.2 MMOL/L (ref 3.5–5.1)
PROT SERPL-MCNC: 7.4 G/DL (ref 6–8.4)
PROT UR QL STRIP: ABNORMAL
RBC # BLD AUTO: 4.87 M/UL (ref 4–5.4)
RBC #/AREA URNS HPF: 49 /HPF (ref 0–4)
SODIUM SERPL-SCNC: 135 MMOL/L (ref 136–145)
SP GR UR STRIP: 1.03 (ref 1–1.03)
SQUAMOUS #/AREA URNS HPF: 14 /HPF
URN SPEC COLLECT METH UR: ABNORMAL
UROBILINOGEN UR STRIP-ACNC: NEGATIVE EU/DL
WBC # BLD AUTO: 14.2 K/UL (ref 3.9–12.7)
WBC #/AREA URNS HPF: >100 /HPF (ref 0–5)

## 2024-01-22 PROCEDURE — 96374 THER/PROPH/DIAG INJ IV PUSH: CPT

## 2024-01-22 PROCEDURE — 96375 TX/PRO/DX INJ NEW DRUG ADDON: CPT

## 2024-01-22 PROCEDURE — 83605 ASSAY OF LACTIC ACID: CPT | Performed by: STUDENT IN AN ORGANIZED HEALTH CARE EDUCATION/TRAINING PROGRAM

## 2024-01-22 PROCEDURE — 63600175 PHARM REV CODE 636 W HCPCS: Performed by: STUDENT IN AN ORGANIZED HEALTH CARE EDUCATION/TRAINING PROGRAM

## 2024-01-22 PROCEDURE — 81025 URINE PREGNANCY TEST: CPT | Performed by: PHYSICIAN ASSISTANT

## 2024-01-22 PROCEDURE — 85025 COMPLETE CBC W/AUTO DIFF WBC: CPT | Performed by: PHYSICIAN ASSISTANT

## 2024-01-22 PROCEDURE — 25000003 PHARM REV CODE 250: Performed by: STUDENT IN AN ORGANIZED HEALTH CARE EDUCATION/TRAINING PROGRAM

## 2024-01-22 PROCEDURE — 83690 ASSAY OF LIPASE: CPT | Performed by: PHYSICIAN ASSISTANT

## 2024-01-22 PROCEDURE — 87086 URINE CULTURE/COLONY COUNT: CPT | Performed by: PHYSICIAN ASSISTANT

## 2024-01-22 PROCEDURE — 99285 EMERGENCY DEPT VISIT HI MDM: CPT | Mod: 25

## 2024-01-22 PROCEDURE — 25500020 PHARM REV CODE 255

## 2024-01-22 PROCEDURE — 81000 URINALYSIS NONAUTO W/SCOPE: CPT | Performed by: PHYSICIAN ASSISTANT

## 2024-01-22 PROCEDURE — 36415 COLL VENOUS BLD VENIPUNCTURE: CPT | Performed by: STUDENT IN AN ORGANIZED HEALTH CARE EDUCATION/TRAINING PROGRAM

## 2024-01-22 PROCEDURE — 36415 COLL VENOUS BLD VENIPUNCTURE: CPT | Performed by: PHYSICIAN ASSISTANT

## 2024-01-22 PROCEDURE — 87040 BLOOD CULTURE FOR BACTERIA: CPT | Performed by: STUDENT IN AN ORGANIZED HEALTH CARE EDUCATION/TRAINING PROGRAM

## 2024-01-22 PROCEDURE — 80053 COMPREHEN METABOLIC PANEL: CPT | Performed by: PHYSICIAN ASSISTANT

## 2024-01-22 RX ORDER — FENTANYL CITRATE 50 UG/ML
25 INJECTION, SOLUTION INTRAMUSCULAR; INTRAVENOUS
Status: COMPLETED | OUTPATIENT
Start: 2024-01-22 | End: 2024-01-22

## 2024-01-22 RX ORDER — KETOROLAC TROMETHAMINE 10 MG/1
10 TABLET, FILM COATED ORAL EVERY 6 HOURS PRN
Qty: 12 TABLET | Refills: 0 | Status: SHIPPED | OUTPATIENT
Start: 2024-01-22 | End: 2024-01-25

## 2024-01-22 RX ORDER — CEFDINIR 300 MG/1
300 CAPSULE ORAL 2 TIMES DAILY
Qty: 20 CAPSULE | Refills: 0 | Status: SHIPPED | OUTPATIENT
Start: 2024-01-22 | End: 2024-02-01

## 2024-01-22 RX ORDER — KETOROLAC TROMETHAMINE 30 MG/ML
15 INJECTION, SOLUTION INTRAMUSCULAR; INTRAVENOUS
Status: COMPLETED | OUTPATIENT
Start: 2024-01-22 | End: 2024-01-22

## 2024-01-22 RX ADMIN — IOHEXOL 100 ML: 350 INJECTION, SOLUTION INTRAVENOUS at 09:01

## 2024-01-22 RX ADMIN — CEFTRIAXONE SODIUM 1 G: 1 INJECTION, POWDER, FOR SOLUTION INTRAMUSCULAR; INTRAVENOUS at 10:01

## 2024-01-22 RX ADMIN — SODIUM CHLORIDE 1000 ML: 9 INJECTION, SOLUTION INTRAVENOUS at 09:01

## 2024-01-22 RX ADMIN — FENTANYL CITRATE 25 MCG: 50 INJECTION INTRAMUSCULAR; INTRAVENOUS at 09:01

## 2024-01-22 RX ADMIN — KETOROLAC TROMETHAMINE 15 MG: 30 INJECTION, SOLUTION INTRAMUSCULAR; INTRAVENOUS at 10:01

## 2024-01-22 NOTE — Clinical Note
"Matilde"Sergo Redding was seen and treated in our emergency department on 1/22/2024.  She may return to work on 01/24/2024.       If you have any questions or concerns, please don't hesitate to call.      Dara Foster RN    "

## 2024-01-23 NOTE — DISCHARGE INSTRUCTIONS
Follow up PCP for further management of dilated CBD.  Return for any worsening pain fever or change in skin colors such as yellowing or any other concerning factors.  Follow up with gynecologist for evaluation of uterine fibroid and take oral antibiotics as directed for UTI.

## 2024-01-23 NOTE — ED PROVIDER NOTES
Encounter Date: 1/22/2024       History     Chief Complaint   Patient presents with    Abdominal Pain     Across lower abd. Started Saturday      48-year-old female presents with lower abdominal pain, right lower quadrant worsened left lower quadrant.  Ongoing for the last 48 hours and associated decreased appetite.  No trauma, fever or chills.  Last meal around noon today.   at bedside and also provides history.  Of appendectomy but has had partial hysterectomy    The history is provided by the patient.     Review of patient's allergies indicates:  No Known Allergies  No past medical history on file.  Past Surgical History:   Procedure Laterality Date    HYSTERECTOMY  6/2017    Ovary removed and fallopian tubes removed    PARTIAL HYSTERECTOMY  2017    WISDOM TOOTH EXTRACTION       Family History   Problem Relation Age of Onset    Hypertension Mother     Cancer Mother     Cancer Father         Stage 4 passed away may 2019    Hypertension Father     Heart disease Father     Melanoma Neg Hx      Social History     Tobacco Use    Smoking status: Never    Smokeless tobacco: Never   Substance Use Topics    Alcohol use: Yes     Alcohol/week: 2.0 standard drinks of alcohol     Types: 1 Glasses of wine, 1 Cans of beer per week    Drug use: Never     Review of Systems   All other systems reviewed and are negative.      Physical Exam     Initial Vitals [01/22/24 1811]   BP Pulse Resp Temp SpO2   135/81 95 20 97.5 °F (36.4 °C) 98 %      MAP       --         Physical Exam    Nursing note and vitals reviewed.  Constitutional: She appears well-developed and well-nourished.   HENT:   Head: Normocephalic and atraumatic.   Eyes: No scleral icterus.   Cardiovascular:  Normal rate and regular rhythm.           Pulmonary/Chest: Effort normal. No stridor. No respiratory distress.   Abdominal: There is abdominal tenderness.   Right lower quadrant tenderness to palpation, left lower quadrant tenderness to palpation, no guarding    Musculoskeletal:         General: No tenderness.     Neurological: She is alert.   Skin: Skin is warm. No rash noted. No erythema.         ED Course   Procedures  Labs Reviewed   CBC W/ AUTO DIFFERENTIAL - Abnormal; Notable for the following components:       Result Value    WBC 14.20 (*)     MPV 8.8 (*)     Gran # (ANC) 10.5 (*)     Gran % 74.1 (*)     All other components within normal limits   COMPREHENSIVE METABOLIC PANEL - Abnormal; Notable for the following components:    Sodium 135 (*)     CO2 22 (*)     Albumin 3.3 (*)     All other components within normal limits   URINALYSIS, REFLEX TO URINE CULTURE - Abnormal; Notable for the following components:    Appearance, UA Hazy (*)     Protein, UA 3+ (*)     Ketones, UA 1+ (*)     Occult Blood UA 3+ (*)     Leukocytes, UA 2+ (*)     All other components within normal limits    Narrative:     Specimen Source->Urine   URINALYSIS MICROSCOPIC - Abnormal; Notable for the following components:    RBC, UA 49 (*)     WBC, UA >100 (*)     Bacteria Moderate (*)     Hyaline Casts, UA 15 (*)     All other components within normal limits    Narrative:     Specimen Source->Urine   CULTURE, URINE   CULTURE, BLOOD   CULTURE, BLOOD   LIPASE   LACTIC ACID, PLASMA   POCT URINE PREGNANCY          Imaging Results              CT Abdomen Pelvis With IV Contrast NO Oral Contrast (In process)                      Medications   cefTRIAXone (Rocephin) 1 g in dextrose 5 % in water (D5W) 100 mL IVPB (MB+) (1 g Intravenous New Bag 1/22/24 2253)   fentaNYL 50 mcg/mL injection 25 mcg (25 mcg Intravenous Given 1/22/24 2105)   sodium chloride 0.9% bolus 1,000 mL 1,000 mL (1,000 mLs Intravenous New Bag 1/22/24 2104)   iohexoL (OMNIPAQUE 350) 350 mg iodine/mL injection (100 mLs Intravenous Given 1/22/24 2122)   ketorolac injection 15 mg (15 mg Intravenous Given 1/22/24 2249)     Medical Decision Making  48-year-old female presents with lower abdominal pain.  No associated dysuria, lab work  obtained by triage provider demonstrated leukocytosis.  IV fentanyl ordered for pain control, patient made NPO and IV fluids administered.  Within differential diagnosis includes diverticulosis versus diverticulitis versus appendicitis versus UTI versus pyelonephritis versus obstructive uropathy.  Lower suspicion for any ovarian torsion as patient has had partial hysterectomy and oophorectomy    -workup demonstrated UTI.  No evidence of any surgical abdomen at this time.  Patient has uterine fibroid per imaging.  We will follow up with gynecologist, ambulatory referral placed.  We will obtain lactic acid, blood cultures and administer IV Rocephin, urine culture pending will discharge with oral antibiotics and Toradol for pain control.  Patient had CBD dilated at 8 mm, normal bilirubin, normal alk phos and LFTs and no right upper quadrant pain, patient will follow up with PCP for further hepatobiliary workup on outpatient basis as opposed to emergent tonight.  Strict return precautions for any worsening symptoms for choledocholithiasis or ascending cholangitis and patient voiced understanding.    Amount and/or Complexity of Data Reviewed  Labs: ordered. Decision-making details documented in ED Course.  Radiology: ordered.    Risk  Prescription drug management.               ED Course as of 01/22/24 2315   Mon Jan 22, 2024 2035 WBC(!): 14.20 [KB]   2035 Gran %(!): 74.1 [KB]   2035 Hemoglobin: 13.8 [KB]   2035 Hematocrit: 41.3 [KB]   2035 Sodium(!): 135 [KB]   2035 CO2(!): 22 [KB]   2035 Glucose: 99 [KB]   2035 Creatinine: 0.9 [KB]   2035 Albumin(!): 3.3 [KB]   2035 Lipase: 15 [KB]   2049 Preg Test, Ur: Negative [KB]   2209 Appearance, UA(!): Hazy [KB]   2209 Occult Blood UA(!): 3+ [KB]   2209 Leukocytes, UA(!): 2+ [KB]   2209 UROBILINOGEN UA: Negative [KB]   2209 RBC, UA(!): 49 [KB]   2209 WBC, UA(!): >100 [KB]   2209 Hyaline Casts, UA(!): 15 [KB]   2315 Lactate, Michael: 1.0 [KB]      ED Course User Index  [KB]  Omar Gomez Jr., DO                           Clinical Impression:  Final diagnoses:  [R10.30] Lower abdominal pain (Primary)  [N39.0, R31.9] Urinary tract infection with hematuria, site unspecified  [D25.9] Uterine leiomyoma, unspecified location  [K83.8] Dilated cbd, acquired          ED Disposition Condition    Discharge Stable          ED Prescriptions       Medication Sig Dispense Start Date End Date Auth. Provider    cefdinir (OMNICEF) 300 MG capsule Take 1 capsule (300 mg total) by mouth 2 (two) times daily. for 10 days 20 capsule 1/22/2024 2/1/2024 Omar Gomez Jr., DO    ketorolac (TORADOL) 10 mg tablet Take 1 tablet (10 mg total) by mouth every 6 (six) hours as needed for Pain. 12 tablet 1/22/2024 1/25/2024 Omar Gomez Jr., DO          Follow-up Information       Follow up With Specialties Details Why Contact Info Additional Information    [facility] Emergency Medicine In 1 day As needed, If symptoms worsen [address] [place] [address] [address] [place] [address] 1st floor    Wai Rodriguez MD Internal Medicine   [address]  [facility] [id number] [phone]                Omar Gomez Jr.,   01/22/24 7569

## 2024-01-23 NOTE — FIRST PROVIDER EVALUATION
Emergency Department TeleTriage Encounter Note      CHIEF COMPLAINT    Chief Complaint   Patient presents with    Abdominal Pain     Across lower abd. Started Saturday        VITAL SIGNS   Initial Vitals [01/22/24 1811]   BP Pulse Resp Temp SpO2   135/81 95 20 97.5 °F (36.4 °C) 98 %      MAP       --            ALLERGIES    Review of patient's allergies indicates:  No Known Allergies    PROVIDER TRIAGE NOTE  Patient presents with lower abdominal pain since Sat. No urinary symptoms. She has been having bowel movements. No nausea or vomiting.       ORDERS  Labs Reviewed - No data to display    ED Orders (720h ago, onward)      None              Virtual Visit Note: The provider triage portion of this emergency department evaluation and documentation was performed via [employer], a HIPAA-compliant telemedicine application, in concert with a tele-presenter in the room. A face to face patient evaluation with one of my colleagues will occur once the patient is placed in an emergency department room.      DISCLAIMER: This note was prepared with [employer] voice recognition transcription software. Garbled syntax, mangled pronouns, and other bizarre constructions may be attributed to that software system.

## 2024-01-24 LAB
BACTERIA UR CULT: NORMAL
BACTERIA UR CULT: NORMAL

## 2024-01-28 LAB
BACTERIA BLD CULT: NORMAL
BACTERIA BLD CULT: NORMAL

## 2024-02-06 NOTE — TELEPHONE ENCOUNTER
----- Message from Bailey Casas sent at 9/5/2019  9:03 AM CDT -----  Contact: self  Pt called in about wanting to see if office received medical record. Pt stated that he send the release form 2 weeks ago.         Pt can be reached at [phone]      TY  
Aware that we do not have any records yet.   
Render Note In Bullet Format When Appropriate: No
Duration Of Freeze Thaw-Cycle (Seconds): 0
Detail Level: Detailed
Show Applicator Variable?: Yes
Post-Care Instructions: I reviewed with the patient in detail post-care instructions. Patient is to wear sunprotection, and avoid picking at any of the treated lesions. Pt may apply Vaseline to crusted or scabbing areas.
Consent: The patient's consent was obtained including but not limited to risks of crusting, scabbing, blistering, scarring, darker or lighter pigmentary change, recurrence, incomplete removal and infection.

## 2024-03-15 ENCOUNTER — PATIENT MESSAGE (OUTPATIENT)
Dept: PRIMARY CARE CLINIC | Facility: CLINIC | Age: 49
End: 2024-03-15
Payer: COMMERCIAL

## 2024-03-15 DIAGNOSIS — F43.9 STRESS AT HOME: ICD-10-CM

## 2024-03-15 DIAGNOSIS — Z56.6 STRESS AT WORK: ICD-10-CM

## 2024-03-15 RX ORDER — BUPROPION HYDROCHLORIDE 300 MG/1
300 TABLET ORAL DAILY
Qty: 90 TABLET | Refills: 3 | Status: SHIPPED | OUTPATIENT
Start: 2024-03-15 | End: 2025-03-15

## 2024-03-15 SDOH — SOCIAL DETERMINANTS OF HEALTH (SDOH): OTHER PHYSICAL AND MENTAL STRAIN RELATED TO WORK: Z56.6

## 2024-03-15 NOTE — TELEPHONE ENCOUNTER
No care due was identified.  [facility] Embedded Care Due Messages. Reference number: [id number].   3/15/2024 8:12:29 AM CDT

## 2024-04-22 PROBLEM — Z00.00 ANNUAL PHYSICAL EXAM: Status: RESOLVED | Noted: 2019-12-05 | Resolved: 2024-04-22

## 2024-06-12 RX ORDER — AMLODIPINE BESYLATE 5 MG/1
5 TABLET ORAL
Qty: 90 TABLET | Refills: 0 | Status: SHIPPED | OUTPATIENT
Start: 2024-06-12

## 2024-06-12 NOTE — TELEPHONE ENCOUNTER
No care due was identified.  [facility] Embedded Care Due Messages. Reference number: [id number].   6/12/2024 9:55:38 AM CDT

## 2024-07-15 ENCOUNTER — PATIENT MESSAGE (OUTPATIENT)
Dept: PRIMARY CARE CLINIC | Facility: CLINIC | Age: 49
End: 2024-07-15
Payer: COMMERCIAL

## 2024-09-13 RX ORDER — AMLODIPINE BESYLATE 5 MG/1
5 TABLET ORAL
Qty: 90 TABLET | Refills: 0 | Status: SHIPPED | OUTPATIENT
Start: 2024-09-13

## 2024-09-13 NOTE — TELEPHONE ENCOUNTER
No care due was identified.  [facility] Embedded Care Due Messages. Reference number: [id number].   9/13/2024 6:30:52 AM CDT

## 2024-09-26 ENCOUNTER — OCCUPATIONAL HEALTH (OUTPATIENT)
Dept: URGENT CARE | Facility: CLINIC | Age: 49
End: 2024-09-26

## 2024-09-26 DIAGNOSIS — Z02.83 ENCOUNTER FOR DRUG SCREENING: Primary | ICD-10-CM

## 2024-09-26 LAB
CTP QC/QA: YES
POC 10 PANEL DRUG SCREEN: NEGATIVE

## 2024-10-04 ENCOUNTER — HOSPITAL ENCOUNTER (OUTPATIENT)
Dept: RADIOLOGY | Facility: HOSPITAL | Age: 49
Discharge: HOME | End: 2024-10-04
Payer: COMMERCIAL

## 2024-10-04 ENCOUNTER — OFFICE VISIT (OUTPATIENT)
Dept: URGENT CARE | Facility: CLINIC | Age: 49
End: 2024-10-04
Payer: COMMERCIAL

## 2024-10-04 VITALS
OXYGEN SATURATION: 98 % | HEART RATE: 81 BPM | RESPIRATION RATE: 18 BRPM | TEMPERATURE: 97 F | SYSTOLIC BLOOD PRESSURE: 147 MMHG | DIASTOLIC BLOOD PRESSURE: 89 MMHG

## 2024-10-04 DIAGNOSIS — R31.9 HEMATURIA, UNSPECIFIED TYPE: ICD-10-CM

## 2024-10-04 DIAGNOSIS — R39.9 URINARY TRACT INFECTION SYMPTOMS: ICD-10-CM

## 2024-10-04 DIAGNOSIS — M54.50 ACUTE MIDLINE LOW BACK PAIN WITHOUT SCIATICA: ICD-10-CM

## 2024-10-04 DIAGNOSIS — R93.89 ABNORMAL CT SCAN: ICD-10-CM

## 2024-10-04 DIAGNOSIS — R10.30 LOWER ABDOMINAL PAIN: ICD-10-CM

## 2024-10-04 DIAGNOSIS — R31.9 HEMATURIA, UNSPECIFIED TYPE: Primary | ICD-10-CM

## 2024-10-04 LAB
BILIRUB UR QL STRIP: NEGATIVE
GLUCOSE UR QL STRIP: NEGATIVE
KETONES UR QL STRIP: NEGATIVE
LEUKOCYTE ESTERASE UR QL STRIP: NEGATIVE
PH, POC UA: 6
POC BLOOD, URINE: POSITIVE
POC NITRATES, URINE: NEGATIVE
PROT UR QL STRIP: POSITIVE
SP GR UR STRIP: 1 (ref 1–1.03)
UROBILINOGEN UR STRIP-ACNC: 0.2 (ref 0.1–1.1)

## 2024-10-04 PROCEDURE — 74176 CT ABD & PELVIS W/O CONTRAST: CPT | Mod: 26,,, | Performed by: RADIOLOGY

## 2024-10-04 PROCEDURE — 74176 CT ABD & PELVIS W/O CONTRAST: CPT | Mod: TC

## 2024-10-04 RX ORDER — NAPROXEN 500 MG/1
500 TABLET ORAL 2 TIMES DAILY WITH MEALS
Qty: 30 TABLET | Refills: 0 | Status: SHIPPED | OUTPATIENT
Start: 2024-10-04

## 2024-10-04 RX ORDER — CETIRIZINE HYDROCHLORIDE 10 MG/1
10 TABLET ORAL
COMMUNITY

## 2024-10-04 NOTE — PROGRESS NOTES
Subjective:      Patient ID: Matilde Redding is a 49 y.o. female.    Vitals:  temperature is 97 °F (36.1 °C). Her blood pressure is 147/89 (abnormal) and her pulse is 81. Her respiration is 18 and oxygen saturation is 98%.     Chief Complaint: Dysuria    49-year-old female presenting to clinic with a complaint of urinary urgency, frequency, lower pelvic pain, and lower back pain that began last night.  Use a heating pad, and back pain has resolved.  Continues to have urinary urgency and frequency.  Denies dysuria, but has constant pelvic pain.  She reports previous pelvic mass and had a fallopian tube, and ovary removed, but it was unsure what side.  Denies hematuria.  Denies fever.  Denies nausea, vomiting, diarrhea, or constipation.  Performs women's hygiene is typical.  Such as wiping front to back, voiding before and after intercourse, and avoiding holding urine.  She denies frequent recurrent UTIs.  Denies taking glp 1 medications.  Tinnitus any history of diverticulitis as well.    Dysuria   This is a new problem. The current episode started in the past 7 days (x 3 days). The problem has been gradually worsening. The quality of the pain is described as aching. There has been no fever. There is No history of pyelonephritis. Associated symptoms include frequency and urgency. Associated symptoms comments: Abdominal pain, Back pain. She has tried increased fluids (Heating pad) for the symptoms. The treatment provided mild relief. Her past medical history is significant for hypertension. There is no history of recurrent UTIs.       Constitution: Negative for fever.   Gastrointestinal: Negative.    Genitourinary:  Positive for frequency, urgency and pelvic pain. Negative for dysuria.   Musculoskeletal:  Positive for back pain.      Objective:     Physical Exam   Constitutional: She is oriented to person, place, and time. She is cooperative.   HENT:   Head: Normocephalic and atraumatic.   Ears:   Right Ear: External  ear normal.   Left Ear: External ear normal.   Nose: Nose normal.   Mouth/Throat: Uvula is midline, oropharynx is clear and moist and mucous membranes are normal. Mucous membranes are moist. Oropharynx is clear.   Eyes: Conjunctivae and lids are normal. Pupils are equal, round, and reactive to light. Extraocular movement intact   Neck: Trachea normal and phonation normal. Neck supple.   Cardiovascular: Normal rate, regular rhythm, normal heart sounds and normal pulses.   Pulmonary/Chest: Effort normal and breath sounds normal.   Abdominal: Normal appearance. Soft. flat abdomen There is abdominal tenderness. There is guarding. There is no right CVA tenderness.   Musculoskeletal: Normal range of motion.         General: No tenderness or signs of injury. Normal range of motion.   Neurological: no focal deficit. She is alert, oriented to person, place, and time and at baseline. She has normal motor skills, normal sensation and intact cranial nerves (2-12). Gait and coordination normal. GCS eye subscore is 4. GCS verbal subscore is 5. GCS motor subscore is 6.   Skin: Skin is warm and dry. Capillary refill takes 2 to 3 seconds.   Psychiatric: She experiences Normal attention and Normal perception. Her speech is normal and behavior is normal. Mood, judgment and thought content normal.   Nursing note and vitals reviewed.      Assessment:     1. Hematuria, unspecified type    2. Urinary tract infection symptoms    3. Lower abdominal pain    4. Acute midline low back pain without sciatica        Plan:       Hematuria, unspecified type  -     CT Renal Stone Study ABD Pelvis WO; Future; Expected date: 10/04/2024    Urinary tract infection symptoms  -     Cancel: POCT Urinalysis, Dipstick, Automated, W/O Scope  -     POCT Urinalysis, Dipstick, Manual, W/O Scope    Lower abdominal pain  -     CT Renal Stone Study ABD Pelvis WO; Future; Expected date: 10/04/2024    Acute midline low back pain without sciatica  -     CT Renal Stone  Study ABD Pelvis WO; Future; Expected date: 10/04/2024      Back pain that began last night, also began have urinary urgency, and frequency.  Denies hematuria.  Also having lower pelvic cramping.  Previous oophorectomy, but is unsure what side.  Guarding with palpation over the left lower quadrant, and left pelvic region.  CT renal stone study ordered.  Denies history of renal stones.  Also denies history of diverticulitis.  Denies changes in bowel habits.  No aggravating or alleviating factors.        CT Renal Stone Study ABD Pelvis WO    Result Date: 10/4/2024  EXAMINATION: CT RENAL STONE STUDY ABD PELVIS WO CLINICAL HISTORY: Hematuria; Hematuria, unspecified TECHNIQUE: Low dose axial images, sagittal and coronal reformations were obtained from the lung bases to the pubic symphysis.  Contrast was not administered. COMPARISON: CT abdomen pelvis of January 22, 2024. FINDINGS: The liver is of normal size contour and CT density without focal defect.  The gallbladder is nearly empty without CT evidence of stone.  The pancreas is of normal contour and CT density without edema or mass.  The spleen is of normal size and CT density. The adrenal glands are not enlarged.  The kidneys are of normal size contour and CT density for a noncontrast study.  A 1.5 cm probable cyst is identified at the midpole of the right kidney unchanged from the prior study.  No stone or hydronephrosis is seen on either side.  The ureters follow a normal course down to the bladder without dilation or distal stone.  The abdominal aorta and inferior vena cava are of normal caliber. The stomach is of normal configuration.  Small bowel dilatation or air-fluid levels are not seen.  The colon is of normal configuration without distention or mass.  A normal appendix is noted.  Free fluid or free air is not seen. The bladder is of normal contour without mass or asymmetry.  The uterus is not enlarged but there is a bulge at the fundus a probable fibroid.   Free fluid is not demonstrated.     No renal or ureteral stone or hydronephrosis is seen.  A 1.5 cm right renal cyst is noted.  Probable uterine fibroid.  The rest of the CT abdomen and pelvis is negative. Electronically signed by: Azar Gordon MD Date:    10/04/2024 Time:    09:34

## 2024-10-04 NOTE — PATIENT INSTRUCTIONS
[facility] outpatient for stat CT renal stone study.  I will call you with results and plan once resulted.

## 2024-10-10 ENCOUNTER — OFFICE VISIT (OUTPATIENT)
Dept: OBSTETRICS AND GYNECOLOGY | Facility: CLINIC | Age: 49
End: 2024-10-10
Payer: COMMERCIAL

## 2024-10-10 VITALS
DIASTOLIC BLOOD PRESSURE: 78 MMHG | WEIGHT: 269.94 LBS | BODY MASS INDEX: 37.79 KG/M2 | HEART RATE: 92 BPM | HEIGHT: 71 IN | SYSTOLIC BLOOD PRESSURE: 162 MMHG

## 2024-10-10 DIAGNOSIS — D21.9 FIBROID: Primary | ICD-10-CM

## 2024-10-10 DIAGNOSIS — Z12.31 ENCOUNTER FOR SCREENING MAMMOGRAM FOR BREAST CANCER: ICD-10-CM

## 2024-10-10 DIAGNOSIS — R31.21 ASYMPTOMATIC MICROSCOPIC HEMATURIA: ICD-10-CM

## 2024-10-10 PROCEDURE — 1159F MED LIST DOCD IN RCRD: CPT | Mod: CPTII,S$GLB,, | Performed by: STUDENT IN AN ORGANIZED HEALTH CARE EDUCATION/TRAINING PROGRAM

## 2024-10-10 PROCEDURE — 3008F BODY MASS INDEX DOCD: CPT | Mod: CPTII,S$GLB,, | Performed by: STUDENT IN AN ORGANIZED HEALTH CARE EDUCATION/TRAINING PROGRAM

## 2024-10-10 PROCEDURE — 99204 OFFICE O/P NEW MOD 45 MIN: CPT | Mod: S$GLB,,, | Performed by: STUDENT IN AN ORGANIZED HEALTH CARE EDUCATION/TRAINING PROGRAM

## 2024-10-10 PROCEDURE — 3077F SYST BP >= 140 MM HG: CPT | Mod: CPTII,S$GLB,, | Performed by: STUDENT IN AN ORGANIZED HEALTH CARE EDUCATION/TRAINING PROGRAM

## 2024-10-10 PROCEDURE — 3078F DIAST BP <80 MM HG: CPT | Mod: CPTII,S$GLB,, | Performed by: STUDENT IN AN ORGANIZED HEALTH CARE EDUCATION/TRAINING PROGRAM

## 2024-10-10 PROCEDURE — 1160F RVW MEDS BY RX/DR IN RCRD: CPT | Mod: CPTII,S$GLB,, | Performed by: STUDENT IN AN ORGANIZED HEALTH CARE EDUCATION/TRAINING PROGRAM

## 2024-10-10 PROCEDURE — 99999 PR PBB SHADOW E&M-EST. PATIENT-LVL IV: CPT | Mod: PBBFAC,,, | Performed by: STUDENT IN AN ORGANIZED HEALTH CARE EDUCATION/TRAINING PROGRAM

## 2024-10-10 NOTE — PROGRESS NOTES
Chief Complaint   Patient presents with    Establish Care     Abnormal CT Scan       History of Present Illness   Matilde Redding is 49 y.o.   patient who presents today referred by ELLE Ornelas for finding on CT.  Patient also needs to establish care because her previous GYN had retired. Patient has no complaints today.  She reports regular cycles lasting 3 days, 1 of which is heavy.  She is using COCs.  She is not currently sexually active.  Patient reports severe abd pain in 2024, where she was diagnosed with a UTI.  She had another episode in Oct 2024, which resolved with 1 tab of Naproxen and UTI was not diagnosed this time.    CT A/P (24): There is a 3.4 cm leiomyoma of the uterus   CT renal stone (10.4.24): The uterus is not enlarged but there is a bulge at the fundus a probable fibroid.     Of note, she has reported that her UA always had a large amount blood.     Patient had brought her paper chart.  Reviewed of chart performed with pertinent information listed below (and scanned into chart):    Fibroid fragments during D&C noted  Pap (): ASCUS, negative HRHPV - next due in   MMG (10.30.23): Normal, TC risk 17.8%, Recommend 3D mammography    History  PMH: Denies  [place] Robotic R [place] [facility] [place]  All: NKDA  OB:   GYN: Denies any STIs or abnl Pap  FH: Breast cancer (mom, age unk); Denies any other female/colon cancer or MI prior to 49yo  SH: Denies STEPHANIE  Meds:  Current Outpatient Medications   Medication Instructions    amLODIPine (NORVASC) 5 mg, Oral    buPROPion (WELLBUTRIN XL) 300 mg, Oral, Daily    cetirizine (ZYRTEC) 10 mg    naproxen (NAPROSYN) 500 mg, Oral, 2 times daily with meals    norethin-e.estradiol triphasic (ARANELLE, Cierra,) 0.5/1/0.5-35 mg-mcg Tab No dose, route, or frequency recorded.       Review of Systems   Constitutional:  Negative for chills and fever.   Eyes:  Negative for visual disturbance.   Respiratory:  Negative for cough  "and shortness of breath.    Cardiovascular:  Negative for chest pain and palpitations.   Gastrointestinal:  Negative for abdominal pain, nausea and vomiting.   Genitourinary:  Negative for dysmenorrhea, menstrual problem, vaginal discharge, vaginal pain and vaginal odor.   Neurological:  Negative for headaches.   Psychiatric/Behavioral:  Negative for depression and sleep disturbance. The patient is not nervous/anxious.    All other systems reviewed and are negative.  Breast: Negative for lump and mastodynia        Physical Examination:  Vitals:    10/10/24 0846   BP: (!) 162/78   Pulse: 92   Weight: 122.4 kg (269 lb 15.3 oz)   Height: 5' 11" (1.803 m)        Physical Exam  Vitals reviewed.   Constitutional:       Appearance: Normal appearance.   HENT:      Head: Normocephalic and atraumatic.   Eyes:      Conjunctiva/sclera: Conjunctivae normal.   Cardiovascular:      Rate and Rhythm: Normal rate and regular rhythm.   Pulmonary:      Effort: Pulmonary effort is normal.      Breath sounds: Normal breath sounds. No wheezing.   Abdominal:      General: Abdomen is flat.      Palpations: Abdomen is soft.      Tenderness: There is no abdominal tenderness.   Musculoskeletal:         General: Normal range of motion.      Cervical back: Normal range of motion.   Skin:     General: Skin is warm and dry.   Neurological:      General: No focal deficit present.      Mental Status: She is alert and oriented to person, place, and time.   Psychiatric:         Mood and Affect: Mood normal.         Behavior: Behavior normal.         Thought Content: Thought content normal.         Judgment: Judgment normal.          Assessment:    1. Fibroid        2. Encounter for screening mammogram for breast cancer  Mammo Digital Screening [facility] w/ [facility]      3. Asymptomatic microscopic hematuria  Ambulatory referral/consult to Urology          Plan:  Discussed fibroid uterus was noted in 2017.  However, given that patient is asymptomatic, " reassurance given.  No interval changes notes on CT between [place] and Oct when comparing Cts.  Discussed fibroid symptoms.  Counseled on options for treatment if fibroid becomes bothersome.  Discussed MMG.  Screening MMG scheduled.  Annual due next month  Will refer to Urology for persistent hematuria for evaluation  Counseled on colon cancer screening.  Will follow up with PCP re: Cologard or similar test  Pap discussed with patient.  Counseled on ASCUS and negative HRHPV findings.  Repeat due in 2026.    I spent a total of 45 minutes on the day of the visit.This includes face to face time and non-face to face time preparing to see the patient (eg, review of tests), obtaining and/or reviewing separately obtained history, documenting clinical information in the electronic or other health record, independently interpreting results and communicating results to the patient/family/caregiver, or care coordinator.

## 2024-10-11 ENCOUNTER — PATIENT MESSAGE (OUTPATIENT)
Dept: ADMINISTRATIVE | Facility: OTHER | Age: 49
End: 2024-10-11
Payer: COMMERCIAL

## 2024-10-27 ENCOUNTER — PATIENT MESSAGE (OUTPATIENT)
Dept: ADMINISTRATIVE | Facility: HOSPITAL | Age: 49
End: 2024-10-27
Payer: COMMERCIAL

## 2024-10-28 DIAGNOSIS — Z12.11 SCREENING FOR COLON CANCER: ICD-10-CM

## 2024-10-30 ENCOUNTER — HOSPITAL ENCOUNTER (OUTPATIENT)
Dept: RADIOLOGY | Facility: HOSPITAL | Age: 49
Discharge: HOME | End: 2024-10-30
Attending: STUDENT IN AN ORGANIZED HEALTH CARE EDUCATION/TRAINING PROGRAM
Payer: COMMERCIAL

## 2024-10-30 ENCOUNTER — PATIENT MESSAGE (OUTPATIENT)
Dept: OBSTETRICS AND GYNECOLOGY | Facility: CLINIC | Age: 49
End: 2024-10-30
Payer: COMMERCIAL

## 2024-10-30 DIAGNOSIS — Z12.31 ENCOUNTER FOR SCREENING MAMMOGRAM FOR BREAST CANCER: ICD-10-CM

## 2024-10-30 DIAGNOSIS — Z30.41 ENCOUNTER FOR SURVEILLANCE OF CONTRACEPTIVE PILLS: Primary | ICD-10-CM

## 2024-10-30 PROCEDURE — 77063 BREAST TOMOSYNTHESIS BI: CPT | Mod: TC

## 2024-10-30 PROCEDURE — 77067 SCR MAMMO BI INCL CAD: CPT | Mod: TC

## 2024-10-30 PROCEDURE — 77067 SCR MAMMO BI INCL CAD: CPT | Mod: 26,,, | Performed by: RADIOLOGY

## 2024-10-30 PROCEDURE — 77063 BREAST TOMOSYNTHESIS BI: CPT | Mod: 26,,, | Performed by: RADIOLOGY

## 2024-10-30 RX ORDER — NORETHINDRONE AND ETHINYL ESTRADIOL 7-9-5
1 KIT ORAL DAILY
Qty: 84 EACH | Refills: 3 | Status: SHIPPED | OUTPATIENT
Start: 2024-10-30

## 2024-12-02 ENCOUNTER — OFFICE VISIT (OUTPATIENT)
Dept: OBSTETRICS AND GYNECOLOGY | Facility: CLINIC | Age: 49
End: 2024-12-02
Payer: COMMERCIAL

## 2024-12-02 VITALS
DIASTOLIC BLOOD PRESSURE: 81 MMHG | HEIGHT: 71 IN | BODY MASS INDEX: 38.56 KG/M2 | SYSTOLIC BLOOD PRESSURE: 147 MMHG | WEIGHT: 275.44 LBS

## 2024-12-02 DIAGNOSIS — Z01.419 WELL FEMALE EXAM WITH ROUTINE GYNECOLOGICAL EXAM: Primary | ICD-10-CM

## 2024-12-02 DIAGNOSIS — Z30.41 ENCOUNTER FOR SURVEILLANCE OF CONTRACEPTIVE PILLS: ICD-10-CM

## 2024-12-02 PROCEDURE — 3077F SYST BP >= 140 MM HG: CPT | Mod: CPTII,S$GLB,, | Performed by: STUDENT IN AN ORGANIZED HEALTH CARE EDUCATION/TRAINING PROGRAM

## 2024-12-02 PROCEDURE — 3079F DIAST BP 80-89 MM HG: CPT | Mod: CPTII,S$GLB,, | Performed by: STUDENT IN AN ORGANIZED HEALTH CARE EDUCATION/TRAINING PROGRAM

## 2024-12-02 PROCEDURE — 99396 PREV VISIT EST AGE 40-64: CPT | Mod: S$GLB,,, | Performed by: STUDENT IN AN ORGANIZED HEALTH CARE EDUCATION/TRAINING PROGRAM

## 2024-12-02 PROCEDURE — 3008F BODY MASS INDEX DOCD: CPT | Mod: CPTII,S$GLB,, | Performed by: STUDENT IN AN ORGANIZED HEALTH CARE EDUCATION/TRAINING PROGRAM

## 2024-12-02 PROCEDURE — 99999 PR PBB SHADOW E&M-EST. PATIENT-LVL III: CPT | Mod: PBBFAC,,, | Performed by: STUDENT IN AN ORGANIZED HEALTH CARE EDUCATION/TRAINING PROGRAM

## 2024-12-02 PROCEDURE — 1159F MED LIST DOCD IN RCRD: CPT | Mod: CPTII,S$GLB,, | Performed by: STUDENT IN AN ORGANIZED HEALTH CARE EDUCATION/TRAINING PROGRAM

## 2024-12-02 PROCEDURE — 1160F RVW MEDS BY RX/DR IN RCRD: CPT | Mod: CPTII,S$GLB,, | Performed by: STUDENT IN AN ORGANIZED HEALTH CARE EDUCATION/TRAINING PROGRAM

## 2024-12-02 NOTE — PATIENT INSTRUCTIONS
Well Visit, Ages 18 to 65: Care Instructions    Well visits can help you stay healthy. Your doctor has checked your overall health and may have suggested ways to take good care of yourself. Your doctor also may have recommended tests. You can help prevent illness with healthy eating, good sleep, vaccinations, regular exercise, and other steps.          Get screening tests that you and your doctor decide on. Screening helps find diseases before any symptoms appear.         Eat healthy foods. Choose fruits, vegetables, whole grains, protein, and low-fat dairy foods. Limit fat, especially saturated fat. Reduce salt in your diet.         Limit alcohol. Have no more than 1 drink a day or 7 drinks a week.         Get at least 30 minutes of exercise on most days of the week. Walking is a good choice. You also may want to do other activities, such as running, swimming, cycling, or playing tennis or team sports.         Reach and stay at a healthy weight. This will lower your risk for many problems, such as obesity, diabetes, heart disease, and high blood pressure.         Do not smoke. Smoking can make health problems worse. If you need help quitting, talk to your doctor about stop-smoking programs and medicines. These can increase your chances of quitting for good.         Care for your mental health. It is easy to get weighed down by worry and stress. Learn strategies to manage stress, like deep breathing and mindfulness, and stay connected with your family and community. If you find you often feel sad or hopeless, talk with your doctor. Treatment can help.         Talk to your doctor about whether you have any risk factors for sexually transmitted infections (STIs). You can help prevent STIs if you wait to have sex with a new partner (or partners) until you've each been tested for STIs. It also helps if you use condoms (male or female condoms) and if you limit your sex partners to one person who only has sex with you.  Vaccines are available for some STIs.         If you think you may have a problem with alcohol or drug use, talk to your doctor. This includes prescription medicines (such as amphetamines and opioids) and illegal drugs (such as cocaine and methamphetamine). Your doctor can help you figure out what type of treatment is best for you.         Protect your skin from too much sun. When you're outdoors from 10 a.m. to 4 p.m., stay in the shade or cover up with clothing and a hat with a wide brim. Wear sunglasses that block UV rays. Even when it's cloudy, put broad-spectrum sunscreen (SPF 30 or higher) on any exposed skin.         See a dentist one or two times a year for checkups and to have your teeth cleaned.         Wear a seat belt in the car.          Start daily vitamin D supplements.

## 2024-12-02 NOTE — PROGRESS NOTES
"Subjective:      Matilde Redding is a 49 y.o. female  who presents for an annual exam. The patient has no complaints today.     The patient is not currently sexually active.  Patient is currently using oral contraceptives (estrogen/progesterone) for contraception.  She reports regular periods every month, lasting 3 days.      The patient lives with her . The patient reports that there is not domestic violence in her life.     Medical History  PMH: Denies  Psx: Robotic R oophorectomy and B salpingectomy, [facility] [place]  All: NKDA  OB:   GYN: Denies any STIs or abnl Pap  FH: Breast cancer (mom, age unk); Denies any other female/colon cancer or MI prior to 49yo  SH: Denies STEPHANIE  Meds:  Current Outpatient Medications   Medication Instructions    amLODIPine (NORVASC) 5 mg, Oral    buPROPion (WELLBUTRIN XL) 300 mg, Oral, Daily    cetirizine (ZYRTEC) 10 mg    norethin-e.estradiol triphasic (ARANELLE, 28,) 0.5/1/0.5-35 mg-mcg Tab 1 tablet, Oral, Daily        Review of Systems    Review of Systems   Constitutional:  Negative for chills and fever.   Eyes:  Negative for visual disturbance.   Respiratory:  Negative for cough and shortness of breath.    Cardiovascular:  Negative for chest pain and palpitations.   Gastrointestinal:  Negative for abdominal pain, nausea and vomiting.   Genitourinary:  Negative for dysmenorrhea, menstrual problem, vaginal discharge, vaginal pain and vaginal odor.   Neurological:  Negative for headaches.   Psychiatric/Behavioral:  Negative for depression and sleep disturbance. The patient is not nervous/anxious.    All other systems reviewed and are negative.  Breast: Negative for lump and mastodynia       Objective:          Vitals:    24 1049   BP: (!) 147/81   Weight: 125 kg (275 lb 7.4 oz)   Height: 5' 11" (1.803 m)        Physical Exam  Exam conducted with a chaperone present.   Constitutional:       Appearance: Normal appearance.   HENT:      Head: Normocephalic and " atraumatic.   Eyes:      Conjunctiva/sclera: Conjunctivae normal.   Cardiovascular:      Rate and Rhythm: Normal rate and regular rhythm.   Pulmonary:      Effort: Pulmonary effort is normal.      Breath sounds: Normal breath sounds.   Chest:   Breasts:     Right: Normal. No swelling, bleeding, inverted nipple, mass, nipple discharge, skin change or tenderness.      Left: Normal. No swelling, bleeding, inverted nipple, mass, nipple discharge, skin change or tenderness.   Abdominal:      General: Abdomen is flat.      Palpations: Abdomen is soft.   Genitourinary:     General: Normal vulva.      Exam position: Lithotomy position.      Labia:         Right: No rash, tenderness or lesion.         Left: No rash, tenderness or lesion.       Urethra: No prolapse.      Vagina: No tenderness or bleeding.      Cervix: No cervical motion tenderness or cervical bleeding.      Uterus: Normal.       Adnexa: Right adnexa normal and left adnexa normal.        Right: No tenderness or fullness.          Left: No tenderness or fullness.     Musculoskeletal:         General: Normal range of motion.      Cervical back: Normal range of motion.   Lymphadenopathy:      Upper Body:      Right upper body: No axillary adenopathy.      Left upper body: No axillary adenopathy.      Lower Body: No right inguinal adenopathy. No left inguinal adenopathy.   Skin:     General: Skin is warm and dry.   Neurological:      General: No focal deficit present.      Mental Status: She is alert and oriented to person, place, and time.   Psychiatric:         Mood and Affect: Mood normal.         Behavior: Behavior normal.         Thought Content: Thought content normal.         Judgment: Judgment normal.                  Assessment:          Healthy female exam.  1. Well female exam with routine gynecological exam        2. Encounter for surveillance of contraceptive pills                  Plan:         Prevention/Screening:  Pap with Cotesting (12.2.23):  ASCUS, negative HRHPV  MMG (10.30.24): BIRADS 1  Colon cancer screen done by PCP    Counseling    Counseling on self-breast exams, diet, and exercise.  No douching  Avoid perfumed soaps and lotions in vaginal area  Wear cotton underwear  Avoid keeping damp clothes on in pelvic region    Take all meds as directed    Wear seatbelts in vehicles in motion

## 2024-12-06 ENCOUNTER — OFFICE VISIT (OUTPATIENT)
Dept: DERMATOLOGY | Facility: CLINIC | Age: 49
End: 2024-12-06
Payer: COMMERCIAL

## 2024-12-06 VITALS — WEIGHT: 275.56 LBS | HEIGHT: 71 IN | BODY MASS INDEX: 38.58 KG/M2

## 2024-12-06 DIAGNOSIS — D18.01 CHERRY ANGIOMA: ICD-10-CM

## 2024-12-06 DIAGNOSIS — L81.4 SOLAR LENTIGO: ICD-10-CM

## 2024-12-06 DIAGNOSIS — Z12.83 SKIN CANCER SCREENING: Primary | ICD-10-CM

## 2024-12-06 DIAGNOSIS — D22.9 MULTIPLE BENIGN NEVI: ICD-10-CM

## 2024-12-06 DIAGNOSIS — Z86.018 HISTORY OF DYSPLASTIC NEVUS: ICD-10-CM

## 2024-12-06 DIAGNOSIS — L30.4 INTERTRIGO: ICD-10-CM

## 2024-12-06 DIAGNOSIS — L82.1 SEBORRHEIC KERATOSES: ICD-10-CM

## 2024-12-06 PROCEDURE — 3008F BODY MASS INDEX DOCD: CPT | Mod: CPTII,S$GLB,, | Performed by: DERMATOLOGY

## 2024-12-06 PROCEDURE — 1159F MED LIST DOCD IN RCRD: CPT | Mod: CPTII,S$GLB,, | Performed by: DERMATOLOGY

## 2024-12-06 PROCEDURE — 99214 OFFICE O/P EST MOD 30 MIN: CPT | Mod: S$GLB,,, | Performed by: DERMATOLOGY

## 2024-12-06 PROCEDURE — 1160F RVW MEDS BY RX/DR IN RCRD: CPT | Mod: CPTII,S$GLB,, | Performed by: DERMATOLOGY

## 2024-12-06 RX ORDER — KETOCONAZOLE 20 MG/G
CREAM TOPICAL
Qty: 60 G | Refills: 11 | Status: SHIPPED | OUTPATIENT
Start: 2024-12-06

## 2024-12-06 NOTE — PROGRESS NOTES
"  Subjective:      Patient ID:  Matilde Redding is a 49 y.o. female who presents for   Chief Complaint   Patient presents with    Skin Check     TBSC      LOV 10/3/23 Intertrigo    Patient here today for TBSC.  C/O spot to left waistline x "been there" and right arm, no symptoms.     Medication refill- Ketoconazole 2% cream controlling rash under breast, controls well.     Derm HX:  Denies Phx of NMSC  H/o DN left thigh in high school, excised  Tanning bed use in youth  Denies Fhx of MM     Current Outpatient Medications:   ·  amLODIPine (NORVASC) 5 MG tablet, TAKE 1 TABLET(5 MG) BY MOUTH EVERY DAY, Disp: 90 tablet, Rfl: 0  ·  buPROPion (WELLBUTRIN XL) 300 MG 24 hr tablet, Take 1 tablet (300 mg total) by mouth once daily., Disp: 90 tablet, Rfl: 3  ·  cetirizine (ZYRTEC) 10 MG tablet, Take 10 mg by mouth., Disp: , Rfl:   ·  norethin-e.estradiol triphasic (ARANELLE, 28,) 0.5/1/0.5-35 mg-mcg Tab, Take 1 tablet by mouth once daily., Disp: 84 each, Rfl: 3          Review of Systems   Constitutional:  Negative for fever, chills and fatigue.   Skin:  Negative for daily sunscreen use (sun avoidance), activity-related sunscreen use and wears hat.   Hematologic/Lymphatic: Does not bruise/bleed easily.       Objective:   Physical Exam   Constitutional: She appears well-developed and well-nourished. No distress.   HENT:   Mouth/Throat: Lips normal.    Eyes: Lids are normal.  No conjunctival no injection.   Cardiovascular:  There is no local extremity swelling and no dependent edema.             Neurological: She is alert and oriented to person, place, and time. She is not disoriented.   Psychiatric: She has a normal mood and affect.   Skin:   Areas Examined (abnormalities noted in diagram):   Scalp / Hair Palpated and Inspected  Head / Face Inspection Performed  Neck Inspection Performed  Chest / Axilla Inspection Performed  Abdomen Inspection Performed  Back Inspection Performed  RUE Inspected  LUE Inspection Performed  Nails " and Digits Inspection Performed                     Diagram Legend     Erythematous scaling macule/papule c/w actinic keratosis       Vascular papule c/w angioma      Pigmented verrucoid papule/plaque c/w seborrheic keratosis      Yellow umbilicated papule c/w sebaceous hyperplasia      Irregularly shaped tan macule c/w lentigo     1-2 mm smooth white papules consistent with Milia      Movable subcutaneous cyst with punctum c/w epidermal inclusion cyst      Subcutaneous movable cyst c/w pilar cyst      Firm pink to brown papule c/w dermatofibroma      Pedunculated fleshy papule(s) c/w skin tag(s)      Evenly pigmented macule c/w junctional nevus     Mildly variegated pigmented, slightly irregular-bordered macule c/w mildly atypical nevus      Flesh colored to evenly pigmented papule c/w intradermal nevus       Pink pearly papule/plaque c/w basal cell carcinoma      Erythematous hyperkeratotic cursted plaque c/w SCC      Surgical scar with no sign of skin cancer recurrence      Open and closed comedones      Inflammatory papules and pustules      Verrucoid papule consistent consistent with wart     Erythematous eczematous patches and plaques     Dystrophic onycholytic nail with subungual debris c/w onychomycosis     Umbilicated papule    Erythematous-base heme-crusted tan verrucoid plaque consistent with inflamed seborrheic keratosis     Erythematous Silvery Scaling Plaque c/w Psoriasis     See annotation      Assessment / Plan:        Skin cancer screening  History of dysplastic nevus  Area of previous DN examined. Site well healed with no signs of recurrence.    Total body skin examination performed today including at least 12 points as noted in physical examination. No lesions suspicious for malignancy noted.    Intertrigo, chronic issue, suboptimal control  -     ketoconazole (NIZORAL) 2 % cream; AAA bid  Dispense: 60 g; Refill: 11  Drying meaures  Ongoing issue; very large breasts   Recommend plastics evaluation  for breast reduction    Multiple benign nevi  Careful dermoscopy evaluation of nevi performed with none identified as needing biopsy today  Monitor for new mole or moles that are becoming bigger, darker, irritated, or developing irregular borders.     Seborrheic keratoses  These are benign inherited growths without a malignant potential. Reassurance given to patient. No treatment is necessary.     Solar lentigo  This is a benign hyperpigmented sun induced lesion. Daily sun protection will reduce the number of new lesions. Treatment of these benign lesions are considered cosmetic.    Cherry angioma  This is a benign vascular lesion. Reassurance given. No treatment required.     Patient instructed in importance in daily broad spectrum sun protection of at least spf 30. Mineral sunscreen ingredients preferred (Zinc +/- Titanium) and can be found OTC.   Patient encouraged to wear hat for all outdoor exposure.   Also discussed sun avoidance and use of protective clothing.             No follow-ups on file.

## 2024-12-07 NOTE — TELEPHONE ENCOUNTER
No care due was identified.  Health [facility] Embedded Care Due Messages. Reference number: [id number].   12/07/2024 8:22:22 AM CST

## 2024-12-09 RX ORDER — AMLODIPINE BESYLATE 5 MG/1
5 TABLET ORAL
Qty: 90 TABLET | Refills: 0 | Status: SHIPPED | OUTPATIENT
Start: 2024-12-09

## 2024-12-29 ENCOUNTER — PATIENT MESSAGE (OUTPATIENT)
Dept: PRIMARY CARE CLINIC | Facility: CLINIC | Age: 49
End: 2024-12-29
Payer: COMMERCIAL

## 2024-12-30 NOTE — TELEPHONE ENCOUNTER
Pt c/o cough, states she thinks it is from the amlodipine. Pt asking is she can hold her amlodipine until she sees PCP in January to see if the cough resolves.

## 2025-01-08 ENCOUNTER — OCCUPATIONAL HEALTH (OUTPATIENT)
Dept: URGENT CARE | Facility: CLINIC | Age: 50
End: 2025-01-08

## 2025-01-08 DIAGNOSIS — Z02.83 ENCOUNTER FOR DRUG SCREENING: Primary | ICD-10-CM

## 2025-01-08 LAB
CTP QC/QA: YES
POC 10 PANEL DRUG SCREEN: NEGATIVE

## 2025-01-08 PROCEDURE — 80305 DRUG TEST PRSMV DIR OPT OBS: CPT | Mod: S$GLB,,, | Performed by: FAMILY MEDICINE

## 2025-01-14 ENCOUNTER — PATIENT MESSAGE (OUTPATIENT)
Dept: PRIMARY CARE CLINIC | Facility: CLINIC | Age: 50
End: 2025-01-14
Payer: COMMERCIAL

## 2025-01-14 DIAGNOSIS — Z00.00 ANNUAL PHYSICAL EXAM: Primary | ICD-10-CM

## 2025-01-16 ENCOUNTER — LAB VISIT (OUTPATIENT)
Dept: LAB | Facility: HOSPITAL | Age: 50
End: 2025-01-16
Attending: INTERNAL MEDICINE
Payer: COMMERCIAL

## 2025-01-16 DIAGNOSIS — Z00.00 ANNUAL PHYSICAL EXAM: ICD-10-CM

## 2025-01-16 LAB
ALBUMIN SERPL BCP-MCNC: 3 G/DL (ref 3.5–5.2)
ALP SERPL-CCNC: 78 U/L (ref 40–150)
ALT SERPL W/O P-5'-P-CCNC: 12 U/L (ref 10–44)
ANION GAP SERPL CALC-SCNC: 5 MMOL/L (ref 8–16)
AST SERPL-CCNC: 9 U/L (ref 10–40)
BASOPHILS # BLD AUTO: 0.07 K/UL (ref 0–0.2)
BASOPHILS NFR BLD: 0.7 % (ref 0–1.9)
BILIRUB SERPL-MCNC: 0.4 MG/DL (ref 0.1–1)
BUN SERPL-MCNC: 13 MG/DL (ref 6–20)
CALCIUM SERPL-MCNC: 8.4 MG/DL (ref 8.7–10.5)
CHLORIDE SERPL-SCNC: 105 MMOL/L (ref 95–110)
CHOLEST SERPL-MCNC: 193 MG/DL (ref 120–199)
CHOLEST/HDLC SERPL: 4 {RATIO} (ref 2–5)
CO2 SERPL-SCNC: 26 MMOL/L (ref 23–29)
CREAT SERPL-MCNC: 0.8 MG/DL (ref 0.5–1.4)
DIFFERENTIAL METHOD BLD: ABNORMAL
EOSINOPHIL # BLD AUTO: 0.3 K/UL (ref 0–0.5)
EOSINOPHIL NFR BLD: 2.4 % (ref 0–8)
ERYTHROCYTE [DISTWIDTH] IN BLOOD BY AUTOMATED COUNT: 13.5 % (ref 11.5–14.5)
EST. GFR  (NO RACE VARIABLE): >60 ML/MIN/1.73 M^2
GLUCOSE SERPL-MCNC: 89 MG/DL (ref 70–110)
HCT VFR BLD AUTO: 41.5 % (ref 37–48.5)
HDLC SERPL-MCNC: 48 MG/DL (ref 40–75)
HDLC SERPL: 24.9 % (ref 20–50)
HGB BLD-MCNC: 13.2 G/DL (ref 12–16)
IMM GRANULOCYTES # BLD AUTO: 0.03 K/UL (ref 0–0.04)
IMM GRANULOCYTES NFR BLD AUTO: 0.3 % (ref 0–0.5)
LDLC SERPL CALC-MCNC: 112.2 MG/DL (ref 63–159)
LYMPHOCYTES # BLD AUTO: 3 K/UL (ref 1–4.8)
LYMPHOCYTES NFR BLD: 29.4 % (ref 18–48)
MCH RBC QN AUTO: 28.1 PG (ref 27–31)
MCHC RBC AUTO-ENTMCNC: 31.8 G/DL (ref 32–36)
MCV RBC AUTO: 89 FL (ref 82–98)
MONOCYTES # BLD AUTO: 0.6 K/UL (ref 0.3–1)
MONOCYTES NFR BLD: 5.6 % (ref 4–15)
NEUTROPHILS # BLD AUTO: 6.3 K/UL (ref 1.8–7.7)
NEUTROPHILS NFR BLD: 61.6 % (ref 38–73)
NONHDLC SERPL-MCNC: 145 MG/DL
NRBC BLD-RTO: 0 /100 WBC
PLATELET # BLD AUTO: 397 K/UL (ref 150–450)
PMV BLD AUTO: 9.6 FL (ref 9.2–12.9)
POTASSIUM SERPL-SCNC: 4.3 MMOL/L (ref 3.5–5.1)
PROT SERPL-MCNC: 6.9 G/DL (ref 6–8.4)
RBC # BLD AUTO: 4.69 M/UL (ref 4–5.4)
SODIUM SERPL-SCNC: 136 MMOL/L (ref 136–145)
TRIGL SERPL-MCNC: 164 MG/DL (ref 30–150)
TSH SERPL DL<=0.005 MIU/L-ACNC: 3.32 UIU/ML (ref 0.4–4)
WBC # BLD AUTO: 10.25 K/UL (ref 3.9–12.7)

## 2025-01-16 PROCEDURE — 80061 LIPID PANEL: CPT | Performed by: INTERNAL MEDICINE

## 2025-01-16 PROCEDURE — 80053 COMPREHEN METABOLIC PANEL: CPT | Performed by: INTERNAL MEDICINE

## 2025-01-16 PROCEDURE — 36415 COLL VENOUS BLD VENIPUNCTURE: CPT | Mod: PO | Performed by: INTERNAL MEDICINE

## 2025-01-16 PROCEDURE — 84443 ASSAY THYROID STIM HORMONE: CPT | Performed by: INTERNAL MEDICINE

## 2025-01-16 PROCEDURE — 85025 COMPLETE CBC W/AUTO DIFF WBC: CPT | Performed by: INTERNAL MEDICINE

## 2025-01-28 ENCOUNTER — OFFICE VISIT (OUTPATIENT)
Dept: PRIMARY CARE CLINIC | Facility: CLINIC | Age: 50
End: 2025-01-28
Payer: COMMERCIAL

## 2025-01-28 VITALS
SYSTOLIC BLOOD PRESSURE: 120 MMHG | RESPIRATION RATE: 18 BRPM | OXYGEN SATURATION: 98 % | WEIGHT: 275.81 LBS | TEMPERATURE: 98 F | DIASTOLIC BLOOD PRESSURE: 80 MMHG | HEART RATE: 57 BPM | BODY MASS INDEX: 38.61 KG/M2 | HEIGHT: 71 IN

## 2025-01-28 DIAGNOSIS — E66.01 SEVERE OBESITY (BMI 35.0-39.9) WITH COMORBIDITY: ICD-10-CM

## 2025-01-28 DIAGNOSIS — R05.3 CHRONIC COUGH: ICD-10-CM

## 2025-01-28 DIAGNOSIS — Z00.00 ANNUAL PHYSICAL EXAM: Primary | ICD-10-CM

## 2025-01-28 PROCEDURE — 99999 PR PBB SHADOW E&M-EST. PATIENT-LVL IV: CPT | Mod: PBBFAC,,, | Performed by: INTERNAL MEDICINE

## 2025-01-28 RX ORDER — PANTOPRAZOLE SODIUM 40 MG/1
40 TABLET, DELAYED RELEASE ORAL
Qty: 90 TABLET | OUTPATIENT
Start: 2025-01-28

## 2025-01-28 RX ORDER — PANTOPRAZOLE SODIUM 40 MG/1
40 TABLET, DELAYED RELEASE ORAL DAILY
Qty: 30 TABLET | Refills: 0 | Status: SHIPPED | OUTPATIENT
Start: 2025-01-28 | End: 2026-01-28

## 2025-01-28 NOTE — TELEPHONE ENCOUNTER
Refill Decision Note   Matilde Redding  is requesting a refill authorization.  Brief Assessment and Rationale for Refill:  Quick Discontinue     Medication Therapy Plan: Receipt confirmed by pharmacy (1/28/2025  8:29 AM CST)      Comments:     Note composed:1:38 PM 01/28/2025

## 2025-01-28 NOTE — TELEPHONE ENCOUNTER
Unable to retrieve patient chart and identify care due.  [facility] Embedded Care Due Messages. Reference number: [id number].   1/28/2025 8:34:18 AM CST

## 2025-01-28 NOTE — PROGRESS NOTES
[facility] Note    Chief Complaint      Chief Complaint   Patient presents with    Annual Exam    Cough       History of Present Illness      Matilde Redding is a 49 y.o. female who presents today for   Chief Complaint   Patient presents with    Annual Exam    Cough   .  Patient comes to appointment here for annual preventative visit with me .she had full labs done with all reviewed with patient by me . She is active with adls . She admits could be better with diet .     Problem List Items Addressed This Visit       Annual physical exam - Primary    Overview     pe documented all labs reviewed with patient today           Severe obesity (BMI 35.0-39.9) with comorbidity    Overview     Counciled on diet and exercise          Chronic cough    Overview     Trial of pantoprazole               Past Medical History:  History reviewed. No pertinent past medical history.    Past Surgical History:  Past Surgical History:   Procedure Laterality Date    HYSTERECTOMY  6/2017    Ovary removed and fallopian tubes removed    HYSTEROSCOPY WITH DILATION AND CURETTAGE OF UTERUS  2017    ROBOT-ASSISTED LAPAROSCOPIC OOPHORECTOMY Right 2017    Fibrothecoma    ROBOT-ASSISTED SALPINGECTOMY Bilateral 2017    during oophorectomy    WISDOM TOOTH EXTRACTION         Family History:  family history includes Breast cancer in her mother; Cancer in her father and mother; Heart disease in her father; Hypertension in her father and mother.    Social History:  Social History     Socioeconomic History    Marital status:    Tobacco Use    Smoking status: Never    Smokeless tobacco: Never   Substance and Sexual Activity    Alcohol use: Yes     Alcohol/week: 2.0 standard drinks of alcohol     Types: 1 Glasses of wine, 1 Cans of beer per week    Drug use: Never    Sexual activity: Not Currently     Partners: Male     Birth control/protection: Other-see comments     Comment: Birth control pill     Social Drivers of Health      Financial Resource Strain: Low Risk  (11/29/2024)    Overall Financial Resource Strain (CARDIA)     Difficulty of Paying Living Expenses: Not very hard   Food Insecurity: No Food Insecurity (11/29/2024)    Hunger Vital Sign     Worried About Running Out of Food in the Last Year: Never true     Ran Out of Food in the Last Year: Never true   Transportation Needs: No Transportation Needs (11/20/2023)    [facility] from [facility]    PRAPARE - Transportation     Lack of Transportation (Medical): No     Lack of Transportation (Non-Medical): No   Physical Activity: Inactive (11/29/2024)    Exercise Vital Sign     Days of Exercise per Week: 0 days     Minutes of Exercise per Session: 0 min   Stress: No Stress Concern Present (11/29/2024)    [employer] [place] [employer] - Occupational Stress Questionnaire     Feeling of Stress : Only a little   Housing Stability: Unknown (11/29/2024)    Housing Stability Vital Sign     Unable to Pay for Housing in the Last Year: No       Review of Systems:   Review of Systems   Constitutional:  Negative for fever and weight loss.   HENT:  Negative for congestion, hearing loss and sore throat.    Eyes:  Negative for blurred vision.   Respiratory:  Positive for cough. Negative for shortness of breath.    Cardiovascular:  Negative for chest pain, palpitations, claudication and leg swelling.   Gastrointestinal:  Negative for abdominal pain, constipation, diarrhea and heartburn.   Genitourinary:  Negative for dysuria.   Musculoskeletal:  Negative for back pain and myalgias.   Skin:  Negative for rash.   Neurological:  Negative for focal weakness and headaches.   Psychiatric/Behavioral:  Negative for depression and suicidal ideas. The patient is not nervous/anxious.          Medications:  Outpatient Encounter Medications as of 1/28/2025   Medication Sig Dispense Refill    amLODIPine (NORVASC) 5 MG tablet TAKE 1 TABLET(5 MG) BY MOUTH EVERY DAY 90 tablet 0    buPROPion  (WELLBUTRIN XL) 300 MG 24 hr tablet Take 1 tablet (300 mg total) by mouth once daily. 90 tablet 3    cetirizine (ZYRTEC) 10 MG tablet Take 10 mg by mouth.      ketoconazole (NIZORAL) 2 % cream AAA bid 60 g 11    norethin-e.estradiol triphasic (ARANELLE, 28,) 0.5/1/0.5-35 mg-mcg Tab Take 1 tablet by mouth once daily. 84 each 3    pantoprazole (PROTONIX) 40 MG tablet Take 1 tablet (40 mg total) by mouth once daily. 30 tablet 0     No facility-administered encounter medications on file as of 1/28/2025.        Allergies:  Review of patient's allergies indicates:  No Known Allergies      Physical Exam         Vitals:    01/28/25 0806   BP: 120/80   Pulse: (!) 57   Resp: 18   Temp: 98 °F (36.7 °C)         Physical Exam  Constitutional:       Appearance: She is well-developed.   Eyes:      Pupils: Pupils are equal, round, and reactive to light.   Neck:      Thyroid: No thyromegaly.   Cardiovascular:      Rate and Rhythm: Normal rate.      Heart sounds: Normal heart sounds. No murmur heard.     No friction rub. No gallop.   Pulmonary:      Breath sounds: Normal breath sounds.   Abdominal:      General: Bowel sounds are normal.      Palpations: Abdomen is soft.   Musculoskeletal:         General: Normal range of motion.      Cervical back: Normal range of motion.   Lymphadenopathy:      Cervical: No cervical adenopathy.   Skin:     General: Skin is warm.      Findings: No rash.   Neurological:      Mental Status: She is alert and oriented to person, place, and time.      Cranial Nerves: No cranial nerve deficit.   Psychiatric:         Behavior: Behavior normal.          Laboratory:  CBC:  Recent Labs   Lab Result Units 01/16/25  0706   WBC K/uL 10.25   RBC M/uL 4.69   Hemoglobin g/dL 13.2   Hematocrit % 41.5   Platelets K/uL 397   MCV fL 89   MCH pg 28.1   MCHC g/dL 31.8*     CMP:  Recent Labs   Lab Result Units 01/16/25  0706   Glucose mg/dL 89   Calcium mg/dL 8.4*   Albumin g/dL 3.0*   Total Protein g/dL 6.9   Sodium  "mmol/L 136   Potassium mmol/L 4.3   CO2 mmol/L 26   Chloride mmol/L 105   BUN mg/dL 13   Alkaline Phosphatase U/L 78   ALT U/L 12   AST U/L 9*   Total Bilirubin mg/dL 0.4     URINALYSIS:  No results for input(s): "COLORU", "CLARITYU", "SPECGRAV", "PHUR", "PROTEINUA", "GLUCOSEU", "BILIRUBINCON", "BLOODU", "WBCU", "RBCU", "BACTERIA", "MUCUS", "NITRITE", "LEUKOCYTESUR", "UROBILINOGEN", "HYALINECASTS" in the last 2160 hours.   LIPIDS:  Recent Labs   Lab Result Units 01/16/25  0706   TSH uIU/mL 3.316   HDL mg/dL 48   Cholesterol mg/dL 193   Triglycerides mg/dL 164*   LDL Cholesterol mg/dL 112.2   HDL/Cholesterol Ratio % 24.9   Non-HDL Cholesterol mg/dL 145   Total Cholesterol/HDL Ratio  4.0     TSH:  Recent Labs   Lab Result Units 01/16/25  0706   TSH uIU/mL 3.316     A1C:  No results for input(s): "HGBA1C" in the last 2160 hours.    Radiology:        Assessment:     Matilde Redding is a 49 y.o.female with:    Annual physical exam    Severe obesity (BMI 35.0-39.9) with comorbidity    Chronic cough  -     pantoprazole (PROTONIX) 40 MG tablet; Take 1 tablet (40 mg total) by mouth once daily.  Dispense: 30 tablet; Refill: 0          Plan:     Problem List Items Addressed This Visit       Annual physical exam - Primary    Overview     pe documented all labs reviewed with patient today           Severe obesity (BMI 35.0-39.9) with comorbidity    Overview     Counciled on diet and exercise          Chronic cough    Overview     Trial of pantoprazole             As above, continue current medications and maintain follow up with specialists.  Return to clinic in 12months.      Frederick W Dantagnan [facility]                  "

## 2025-01-29 ENCOUNTER — PATIENT MESSAGE (OUTPATIENT)
Dept: PRIMARY CARE CLINIC | Facility: CLINIC | Age: 50
End: 2025-01-29
Payer: COMMERCIAL

## 2025-01-29 DIAGNOSIS — J32.9 SINUSITIS, UNSPECIFIED CHRONICITY, UNSPECIFIED LOCATION: Primary | ICD-10-CM

## 2025-01-29 RX ORDER — AZITHROMYCIN 250 MG/1
TABLET, FILM COATED ORAL
Qty: 6 TABLET | Refills: 0 | Status: SHIPPED | OUTPATIENT
Start: 2025-01-29 | End: 2025-02-03

## 2025-01-29 RX ORDER — BROMPHENIRAMINE MALEATE, PSEUDOEPHEDRINE HYDROCHLORIDE, AND DEXTROMETHORPHAN HYDROBROMIDE 2; 30; 10 MG/5ML; MG/5ML; MG/5ML
5 SYRUP ORAL 4 TIMES DAILY PRN
Qty: 118 ML | Refills: 0 | Status: SHIPPED | OUTPATIENT
Start: 2025-01-29 | End: 2025-02-08

## 2025-01-29 NOTE — TELEPHONE ENCOUNTER
LOV with Wai Rodriguez MD , 1/28/2025  Was given trial of Protonix for Chr cough. Pt states the cough is wors today and she is coughing up yellow mucus. Would like to know if you can rec something for her to take

## 2025-02-09 ENCOUNTER — PATIENT MESSAGE (OUTPATIENT)
Dept: PRIMARY CARE CLINIC | Facility: CLINIC | Age: 50
End: 2025-02-09
Payer: COMMERCIAL

## 2025-02-09 DIAGNOSIS — J32.9 SINUSITIS, UNSPECIFIED CHRONICITY, UNSPECIFIED LOCATION: ICD-10-CM

## 2025-02-10 RX ORDER — BROMPHENIRAMINE MALEATE, PSEUDOEPHEDRINE HYDROCHLORIDE, AND DEXTROMETHORPHAN HYDROBROMIDE 2; 30; 10 MG/5ML; MG/5ML; MG/5ML
5 SYRUP ORAL 4 TIMES DAILY PRN
Qty: 118 ML | Refills: 0 | Status: SHIPPED | OUTPATIENT
Start: 2025-02-10 | End: 2025-02-20

## 2025-02-10 NOTE — TELEPHONE ENCOUNTER
LOV with Wai Rodriguez MD , 1/28/2025  Pt was given a trialof Protonix at last visit for chr cough. On 1/29/25 a rx of zpack and Bromfed Dm was sent to pharmacy. Pt states after completing regimen her cough was better but now the last few days the cough is coming back and she is coughing up yellow mucus again. Would like to know what you rec

## 2025-02-12 ENCOUNTER — CLINICAL SUPPORT (OUTPATIENT)
Dept: OTHER | Facility: CLINIC | Age: 50
End: 2025-02-12
Payer: COMMERCIAL

## 2025-02-12 DIAGNOSIS — Z00.8 ENCOUNTER FOR OTHER GENERAL EXAMINATION: ICD-10-CM

## 2025-02-13 VITALS
SYSTOLIC BLOOD PRESSURE: 137 MMHG | HEIGHT: 70 IN | WEIGHT: 269 LBS | BODY MASS INDEX: 38.51 KG/M2 | DIASTOLIC BLOOD PRESSURE: 86 MMHG

## 2025-02-13 LAB
GLUCOSE SERPL-MCNC: 111 MG/DL (ref 60–140)
HDLC SERPL-MCNC: 57 MG/DL
POC CHOLESTEROL, LDL (DOCK): 104 MG/DL
POC CHOLESTEROL, TOTAL: 202 MG/DL
TRIGL SERPL-MCNC: 242 MG/DL

## 2025-02-21 ENCOUNTER — PATIENT MESSAGE (OUTPATIENT)
Dept: PRIMARY CARE CLINIC | Facility: CLINIC | Age: 50
End: 2025-02-21
Payer: COMMERCIAL

## 2025-02-21 DIAGNOSIS — J32.9 SINUSITIS, UNSPECIFIED CHRONICITY, UNSPECIFIED LOCATION: ICD-10-CM

## 2025-02-24 DIAGNOSIS — R05.3 CHRONIC COUGH: ICD-10-CM

## 2025-02-24 RX ORDER — BROMPHENIRAMINE MALEATE, PSEUDOEPHEDRINE HYDROCHLORIDE, AND DEXTROMETHORPHAN HYDROBROMIDE 2; 30; 10 MG/5ML; MG/5ML; MG/5ML
5 SYRUP ORAL 4 TIMES DAILY PRN
Qty: 118 ML | Refills: 0 | Status: SHIPPED | OUTPATIENT
Start: 2025-02-24 | End: 2025-03-06

## 2025-02-24 RX ORDER — PANTOPRAZOLE SODIUM 40 MG/1
40 TABLET, DELAYED RELEASE ORAL
Qty: 30 TABLET | Refills: 0 | Status: SHIPPED | OUTPATIENT
Start: 2025-02-24

## 2025-02-24 NOTE — TELEPHONE ENCOUNTER
No care due was identified.  [facility] Embedded Care Due Messages. Reference number: [id number].   2/24/2025 8:40:22 AM CST

## 2025-02-24 NOTE — TELEPHONE ENCOUNTER
Pt requesting a refill on cough med, pended. Pt is scheduled to return to clinic 2/27 to be re-evaluates for her ongoing cough.     LOV with Wai Rodriguez MD , 1/28/2025

## 2025-02-24 NOTE — TELEPHONE ENCOUNTER
No care due was identified.  [facility] Embedded Care Due Messages. Reference number: [id number].   2/24/2025 7:27:47 AM CST

## 2025-02-24 NOTE — TELEPHONE ENCOUNTER
Refill Routing Note   Medication(s) are not appropriate for processing by [facility] for the following reason(s):        New or recently adjusted medication    [facility] action(s):  Defer               Appointments  past 12m or future 3m with PCP    Date Provider   Last Visit   1/28/2025 Wai Rodriguez MD   Next Visit   2/27/2025 Wai Rodriguez MD   ED visits in past 90 days: 0        Note composed:12:47 PM 02/24/2025

## 2025-03-07 ENCOUNTER — PATIENT MESSAGE (OUTPATIENT)
Dept: PRIMARY CARE CLINIC | Facility: CLINIC | Age: 50
End: 2025-03-07
Payer: COMMERCIAL

## 2025-03-07 DIAGNOSIS — I10 BENIGN ESSENTIAL HYPERTENSION: Primary | ICD-10-CM

## 2025-03-10 RX ORDER — AMLODIPINE BESYLATE 5 MG/1
5 TABLET ORAL DAILY
Qty: 90 TABLET | Refills: 3 | Status: SHIPPED | OUTPATIENT
Start: 2025-03-10

## 2025-03-10 NOTE — TELEPHONE ENCOUNTER
No care due was identified.  [facility] Embedded Care Due Messages. Reference number: [id number].   3/10/2025 9:13:42 AM CDT

## 2025-03-10 NOTE — TELEPHONE ENCOUNTER
Patient requesting refill on   Amlodipine 5 mg  Pt's  LOV with Wai Rodriguez MD , 1/28/2025  Medication pending

## 2025-03-10 NOTE — TELEPHONE ENCOUNTER
Refill Decision Note   Matilde Redding  is requesting a refill authorization.  Brief Assessment and Rationale for Refill:  Approve     Medication Therapy Plan:         Comments:     Note composed:9:22 AM 03/10/2025

## 2025-03-12 DIAGNOSIS — Z56.6 STRESS AT WORK: ICD-10-CM

## 2025-03-12 DIAGNOSIS — F43.9 STRESS AT HOME: ICD-10-CM

## 2025-03-12 RX ORDER — BUPROPION HYDROCHLORIDE 300 MG/1
300 TABLET ORAL DAILY
Qty: 90 TABLET | Refills: 3 | Status: SHIPPED | OUTPATIENT
Start: 2025-03-12 | End: 2026-03-12

## 2025-03-12 SDOH — SOCIAL DETERMINANTS OF HEALTH (SDOH): OTHER PHYSICAL AND MENTAL STRAIN RELATED TO WORK: Z56.6

## 2025-03-12 NOTE — TELEPHONE ENCOUNTER
No care due was identified.  [facility] Embedded Care Due Messages. Reference number: [id number].   3/12/2025 6:41:20 AM CDT

## 2025-03-25 DIAGNOSIS — R05.3 CHRONIC COUGH: ICD-10-CM

## 2025-03-25 RX ORDER — PANTOPRAZOLE SODIUM 40 MG/1
40 TABLET, DELAYED RELEASE ORAL DAILY
Qty: 30 TABLET | Refills: 3 | Status: SHIPPED | OUTPATIENT
Start: 2025-03-25 | End: 2025-03-25 | Stop reason: SDUPTHER

## 2025-03-25 NOTE — TELEPHONE ENCOUNTER
No care due was identified.  [facility] Embedded Care Due Messages. Reference number: [id number].   3/25/2025 10:53:45 AM CDT

## 2025-03-26 RX ORDER — PANTOPRAZOLE SODIUM 40 MG/1
40 TABLET, DELAYED RELEASE ORAL DAILY
Qty: 30 TABLET | Refills: 3 | Status: SHIPPED | OUTPATIENT
Start: 2025-03-26

## 2025-03-26 NOTE — TELEPHONE ENCOUNTER
No care due was identified.  [facility] Embedded Care Due Messages. Reference number: [id number].   3/25/2025 7:14:47 PM CDT

## 2025-06-22 ENCOUNTER — HOSPITAL ENCOUNTER (EMERGENCY)
Facility: HOSPITAL | Age: 50
Discharge: HOME | End: 2025-06-22
Attending: EMERGENCY MEDICINE
Payer: COMMERCIAL

## 2025-06-22 VITALS
TEMPERATURE: 98 F | RESPIRATION RATE: 18 BRPM | WEIGHT: 275 LBS | HEART RATE: 84 BPM | HEIGHT: 72 IN | SYSTOLIC BLOOD PRESSURE: 140 MMHG | OXYGEN SATURATION: 96 % | DIASTOLIC BLOOD PRESSURE: 86 MMHG | BODY MASS INDEX: 37.25 KG/M2

## 2025-06-22 DIAGNOSIS — M79.603 ARM PAIN: ICD-10-CM

## 2025-06-22 DIAGNOSIS — M54.12 CERVICAL RADICULOPATHY: Primary | ICD-10-CM

## 2025-06-22 PROCEDURE — 63600175 PHARM REV CODE 636 W HCPCS: Performed by: EMERGENCY MEDICINE

## 2025-06-22 PROCEDURE — 93005 ELECTROCARDIOGRAM TRACING: CPT

## 2025-06-22 PROCEDURE — 99284 EMERGENCY DEPT VISIT MOD MDM: CPT | Mod: 25

## 2025-06-22 PROCEDURE — 96372 THER/PROPH/DIAG INJ SC/IM: CPT | Performed by: EMERGENCY MEDICINE

## 2025-06-22 RX ORDER — GABAPENTIN 100 MG/1
100 CAPSULE ORAL 3 TIMES DAILY
Qty: 60 CAPSULE | Refills: 0 | Status: SHIPPED | OUTPATIENT
Start: 2025-06-22 | End: 2026-06-22

## 2025-06-22 RX ORDER — METHYLPREDNISOLONE 4 MG/1
TABLET ORAL
Qty: 21 EACH | Refills: 0 | Status: SHIPPED | OUTPATIENT
Start: 2025-06-22 | End: 2025-06-27

## 2025-06-22 RX ORDER — HYDROCODONE BITARTRATE AND ACETAMINOPHEN 5; 325 MG/1; MG/1
1 TABLET ORAL EVERY 6 HOURS PRN
Qty: 12 TABLET | Refills: 0 | Status: SHIPPED | OUTPATIENT
Start: 2025-06-22 | End: 2025-06-27

## 2025-06-22 RX ORDER — METHOCARBAMOL 500 MG/1
500 TABLET, FILM COATED ORAL
COMMUNITY
Start: 2025-06-20 | End: 2025-06-27

## 2025-06-22 RX ORDER — KETOROLAC TROMETHAMINE 30 MG/ML
30 INJECTION, SOLUTION INTRAMUSCULAR; INTRAVENOUS
Status: COMPLETED | OUTPATIENT
Start: 2025-06-22 | End: 2025-06-22

## 2025-06-22 RX ORDER — MORPHINE SULFATE 4 MG/ML
4 INJECTION, SOLUTION INTRAMUSCULAR; INTRAVENOUS
Status: COMPLETED | OUTPATIENT
Start: 2025-06-22 | End: 2025-06-22

## 2025-06-22 RX ADMIN — MORPHINE SULFATE 4 MG: 4 INJECTION INTRAVENOUS at 06:06

## 2025-06-22 RX ADMIN — KETOROLAC TROMETHAMINE 30 MG: 30 INJECTION, SOLUTION INTRAMUSCULAR; INTRAVENOUS at 06:06

## 2025-06-22 NOTE — ED TRIAGE NOTES
Matilde VERONICA Redding is here with left neck/back pain and int left arm for a week and seen at  for same.  No acute injury

## 2025-06-22 NOTE — ED PROVIDER NOTES
Encounter Date: 6/22/2025       History     Chief Complaint   Patient presents with    Back Pain     And neck pain into left arm     This is a 50-year-old female complaining of left neck pain radiating into the left arm.  It began about a week ago.  She denies any trauma.  She denies any weakness or numbness.  She was seen at urgent care and given a Decadron injection and prescribed Robaxin and NSAIDs.  She says these are not helping in the pain is severe.    The history is provided by the patient.     Review of patient's allergies indicates:  No Known Allergies  History reviewed. No pertinent past medical history.  Past Surgical History:   Procedure Laterality Date    HYSTERECTOMY  6/2017    Ovary removed and fallopian tubes removed    HYSTEROSCOPY WITH DILATION AND CURETTAGE OF UTERUS  2017    ROBOT-ASSISTED LAPAROSCOPIC OOPHORECTOMY Right 2017    Fibrothecoma    ROBOT-ASSISTED SALPINGECTOMY Bilateral 2017    during oophorectomy    WISDOM TOOTH EXTRACTION       Family History   Problem Relation Name Age of Onset    Hypertension Mother Matilde Mujica     Breast cancer Mother Matilde Mujica     Cancer Mother Matilde Mujica     Cancer Father Salvador mujica         Stage 4 passed away may 2019    Hypertension Father Salvador mujica     Heart disease Father Salvador mujica     Melanoma Neg Hx       Social History[1]  Review of Systems   Musculoskeletal:  Positive for arthralgias and neck pain.   All other systems reviewed and are negative.      Physical Exam     Initial Vitals [06/22/25 0558]   BP Pulse Resp Temp SpO2   (!) 168/99 90 18 97.9 °F (36.6 °C) 97 %      MAP       --         Physical Exam    Nursing note and vitals reviewed.  Constitutional: She appears well-developed and well-nourished. She is not diaphoretic. No distress.   HENT:   Head: Normocephalic.   Eyes: Conjunctivae are normal.   Neck: Neck supple.   No significant tenderness   Normal range of motion.  Cardiovascular:  Normal rate.           Pulmonary/Chest:  No respiratory distress.   Abdominal: She exhibits no distension.   Musculoskeletal:         General: No edema.      Cervical back: Normal range of motion and neck supple.      Comments: 2+ radial pulse     Neurological: She is alert. She has normal strength.   Equal  strength bilaterally, range of motion is normal, sensation is intact   Skin: Skin is warm and dry.   Psychiatric: She has a normal mood and affect.         ED Course   Procedures  Labs Reviewed - No data to display       Imaging Results    None          Medications   ketorolac injection 30 mg (30 mg Intramuscular Given 6/22/25 0641)   morphine injection 4 mg (4 mg Intramuscular Given 6/22/25 0642)     Medical Decision Making  EKG is normal without ischemic changes.  Symptoms seem very consistent with a cervical radiculopathy.  No symptoms to suggest spinal cord injury.  Treated with IM Toradol and morphine injection, discharged with short course of hydrocodone and Neurontin as well as a Medrol Dosepak.  Advised follow up with her PCP for further evaluation.    Risk  Prescription drug management.                                      Clinical Impression:  Final diagnoses:  [M79.603] Arm pain  [M54.12] Cervical radiculopathy (Primary)          ED Disposition Condition    Discharge Stable          ED Prescriptions       Medication Sig Dispense Start Date End Date Auth. Provider    HYDROcodone-acetaminophen (NORCO) 5-325 mg per tablet Take 1 tablet by mouth every 6 (six) hours as needed for Pain. 12 tablet 6/22/2025 -- Yadiel Agrawal MD    gabapentin (NEURONTIN) 100 MG capsule Take 1 capsule (100 mg total) by mouth 3 (three) times daily. 60 capsule 6/22/2025 6/22/2026 Yadiel Agrawal MD    methylPREDNISolone (MEDROL DOSEPACK) 4 mg tablet use as directed 21 each 6/22/2025 7/13/2025 Yadiel Agrawal MD          Follow-up Information       Follow up With Specialties Details Why Contact Info    Wai Rodriguez MD Internal Medicine   [address]  [address]  [facility] [address]  [phone]                     [1]   Social History  Tobacco Use    Smoking status: Never    Smokeless tobacco: Never   Vaping Use    Vaping status: Never Used   Substance Use Topics    Alcohol use: Yes     Alcohol/week: 2.0 standard drinks of alcohol     Types: 1 Glasses of wine, 1 Cans of beer per week    Drug use: Never        Yadiel Agrawal MD  06/22/25 0714

## 2025-06-25 ENCOUNTER — PATIENT MESSAGE (OUTPATIENT)
Dept: PRIMARY CARE CLINIC | Facility: CLINIC | Age: 50
End: 2025-06-25
Payer: COMMERCIAL

## 2025-06-25 LAB
OHS QRS DURATION: 76 MS
OHS QTC CALCULATION: 401 MS

## 2025-06-25 NOTE — TELEPHONE ENCOUNTER
" LOV with Wai Rodriguez MD , 1/28/2025    Pt has medication concerns, and wants pain meds refilled, and c/o cough.    Pt states: "I'm on pain meds & steroids but it seems to get better then worse again & last night was horrible."      Pt scheduled for an appointment on 06/30 at 9:15AM with Dr. Rodriguez  "

## 2025-06-27 ENCOUNTER — OFFICE VISIT (OUTPATIENT)
Dept: PRIMARY CARE CLINIC | Facility: CLINIC | Age: 50
End: 2025-06-27
Payer: COMMERCIAL

## 2025-06-27 ENCOUNTER — TELEPHONE (OUTPATIENT)
Dept: PRIMARY CARE CLINIC | Facility: CLINIC | Age: 50
End: 2025-06-27

## 2025-06-27 ENCOUNTER — PATIENT OUTREACH (OUTPATIENT)
Dept: ADMINISTRATIVE | Facility: HOSPITAL | Age: 50
End: 2025-06-27
Payer: COMMERCIAL

## 2025-06-27 VITALS
HEIGHT: 72 IN | BODY MASS INDEX: 37.27 KG/M2 | RESPIRATION RATE: 18 BRPM | HEART RATE: 100 BPM | SYSTOLIC BLOOD PRESSURE: 120 MMHG | DIASTOLIC BLOOD PRESSURE: 80 MMHG | OXYGEN SATURATION: 98 % | WEIGHT: 275.13 LBS

## 2025-06-27 DIAGNOSIS — M54.12 CERVICAL RADICULOPATHY: ICD-10-CM

## 2025-06-27 DIAGNOSIS — J01.10 ACUTE NON-RECURRENT FRONTAL SINUSITIS: Primary | ICD-10-CM

## 2025-06-27 PROCEDURE — 99999 PR PBB SHADOW E&M-EST. PATIENT-LVL III: CPT | Mod: PBBFAC,,, | Performed by: INTERNAL MEDICINE

## 2025-06-27 RX ORDER — HYDROCODONE BITARTRATE AND ACETAMINOPHEN 5; 325 MG/1; MG/1
1 TABLET ORAL EVERY 6 HOURS PRN
Qty: 12 TABLET | Refills: 0 | Status: SHIPPED | OUTPATIENT
Start: 2025-06-27

## 2025-06-27 RX ORDER — AMOXICILLIN 500 MG/1
500 TABLET, FILM COATED ORAL EVERY 12 HOURS
Qty: 14 TABLET | Refills: 0 | Status: SHIPPED | OUTPATIENT
Start: 2025-06-27 | End: 2025-07-04

## 2025-06-27 RX ORDER — BROMPHENIRAMINE MALEATE, PSEUDOEPHEDRINE HYDROCHLORIDE, AND DEXTROMETHORPHAN HYDROBROMIDE 2; 30; 10 MG/5ML; MG/5ML; MG/5ML
5 SYRUP ORAL 4 TIMES DAILY PRN
Qty: 118 ML | Refills: 0 | Status: SHIPPED | OUTPATIENT
Start: 2025-06-27 | End: 2025-07-07

## 2025-06-27 NOTE — LETTER
June 27, 2025      [facility]  [address]  [place] [address]  Phone: [phone]       Patient: Matilde Redding   YOB: 1975  Date of Visit: 06/27/2025    To Whom It May Concern:    Annabelle Redding  was at [facility] on 06/27/2025. The patient may return to work/school on 06/27/2025 with no restrictions. If you have any questions or concerns, or if I can be of further assistance, please do not hesitate to contact me.    Sincerely,    Antonio Suarez MA

## 2025-06-27 NOTE — PROGRESS NOTES
[facility] Note    Chief Complaint      Chief Complaint   Patient presents with    Follow-up    Sore Throat       History of Present Illness      Matilde Redding is a 50 y.o. female who presents today for   Chief Complaint   Patient presents with    Follow-up    Sore Throat   .  Patient comes to appointment here for acute visit related to above . She went to [facility] for acute neck pain . Given steroid pack , hydrocodone and gabapentin . She in the last few days has developed sinus pressure congestion loss of voice .    Problem List Items Addressed This Visit       Acute non-recurrent frontal sinusitis - Primary    Overview   Complete abx   Add amoxil x 7 d   Bromphed          Cervical radiculopathy    Overview   Has ortho visit on Tuesday   Refill hydrocodone disp12               Past Medical History:  History reviewed. No pertinent past medical history.    Past Surgical History:  Past Surgical History:   Procedure Laterality Date    HYSTERECTOMY  6/2017    Ovary removed and fallopian tubes removed    HYSTEROSCOPY WITH DILATION AND CURETTAGE OF UTERUS  2017    ROBOT-ASSISTED LAPAROSCOPIC OOPHORECTOMY Right 2017    Fibrothecoma    ROBOT-ASSISTED SALPINGECTOMY Bilateral 2017    during oophorectomy    WISDOM TOOTH EXTRACTION         Family History:  family history includes Breast cancer in her mother; Cancer in her father and mother; Heart disease in her father; Hypertension in her father and mother.    Social History:  Social History[1]    Review of Systems:   Review of Systems   Constitutional:  Negative for fever and weight loss.   HENT:  Positive for congestion and sinus pain. Negative for hearing loss and sore throat.    Eyes:  Negative for blurred vision.   Respiratory:  Negative for cough and shortness of breath.    Cardiovascular:  Negative for chest pain, palpitations, claudication and leg swelling.   Gastrointestinal:  Negative for abdominal pain, constipation, diarrhea and  "heartburn.   Genitourinary:  Negative for dysuria.   Musculoskeletal:  Positive for neck pain. Negative for back pain and myalgias.   Skin:  Negative for rash.   Neurological:  Positive for headaches. Negative for focal weakness.   Psychiatric/Behavioral:  Negative for depression and suicidal ideas. The patient is not nervous/anxious.          Medications:  Encounter Medications[2]     Allergies:  Review of patient's allergies indicates:  No Known Allergies      Physical Exam         Vitals:    06/27/25 0902   BP: 120/80   Pulse: 100   Resp: 18         Physical Exam  Constitutional:       Appearance: She is well-developed.   Eyes:      Pupils: Pupils are equal, round, and reactive to light.   Neck:      Thyroid: No thyromegaly.   Cardiovascular:      Rate and Rhythm: Normal rate.      Heart sounds: Normal heart sounds. No murmur heard.     No friction rub. No gallop.   Pulmonary:      Breath sounds: Normal breath sounds.   Abdominal:      General: Bowel sounds are normal.      Palpations: Abdomen is soft.   Musculoskeletal:         General: Normal range of motion.      Cervical back: Normal range of motion.   Lymphadenopathy:      Cervical: No cervical adenopathy.   Skin:     General: Skin is warm.      Findings: No rash.   Neurological:      Mental Status: She is alert and oriented to person, place, and time.      Cranial Nerves: No cranial nerve deficit.   Psychiatric:         Behavior: Behavior normal.          Laboratory:  CBC:  No results for input(s): "WBC", "RBC", "HGB", "HCT", "PLT", "MCV", "MCH", "MCHC" in the last 2160 hours.  CMP:  No results for input(s): "GLU", "CALCIUM", "ALBUMIN", "PROT", "NA", "K", "CO2", "CL", "BUN", "ALKPHOS", "ALT", "AST", "BILITOT" in the last 2160 hours.    Invalid input(s): "CREATININ"  URINALYSIS:  No results for input(s): "COLORU", "CLARITYU", "SPECGRAV", "PHUR", "PROTEINUA", "GLUCOSEU", "BILIRUBINCON", "BLOODU", "WBCU", "RBCU", "BACTERIA", "MUCUS", "NITRITE", "LEUKOCYTESUR", " ""UROBILINOGEN", "HYALINECASTS" in the last 2160 hours.   LIPIDS:  No results for input(s): "TSH", "HDL", "CHOL", "TRIG", "LDLCALC", "CHOLHDL", "NONHDLCHOL", "TOTALCHOLEST" in the last 2160 hours.  TSH:  No results for input(s): "TSH" in the last 2160 hours.  A1C:  No results for input(s): "HGBA1C" in the last 2160 hours.    Radiology:        Assessment:     Matilde Redding is a 50 y.o.female with:    Acute non-recurrent frontal sinusitis  -     amoxicillin (AMOXIL) 500 MG Tab; Take 1 tablet (500 mg total) by mouth every 12 (twelve) hours. for 7 days  Dispense: 14 tablet; Refill: 0  -     brompheniramine-pseudoeph-DM (BROMFED DM) 2-30-10 mg/5 mL Syrp; Take 5 mLs by mouth 4 (four) times daily as needed.  Dispense: 118 mL; Refill: 0    Cervical radiculopathy  -     HYDROcodone-acetaminophen (NORCO) 5-325 mg per tablet; Take 1 tablet by mouth every 6 (six) hours as needed for Pain.  Dispense: 12 tablet; Refill: 0          Plan:     Problem List Items Addressed This Visit       Acute non-recurrent frontal sinusitis - Primary    Overview   Complete abx   Add amoxil x 7 d   Bromphed          Cervical radiculopathy    Overview   Has ortho visit on Tuesday   Refill hydrocodone disp12             As above, continue current medications and maintain follow up with specialists.  Return to clinic as scheduled       [facility] Dantagn[facility]                       [1]   Social History  Socioeconomic History    Marital status:    Tobacco Use    Smoking status: Never    Smokeless tobacco: Never   Vaping Use    Vaping status: Never Used   Substance and Sexual Activity    Alcohol use: Yes     Alcohol/week: 2.0 standard drinks of alcohol     Types: 1 Glasses of wine, 1 Cans of beer per week    Drug use: Never    Sexual activity: Not Currently     Partners: Male     Birth control/protection: Other-see comments     Comment: Birth control pill     Social Drivers of Health     Financial " Resource Strain: Low Risk  (11/29/2024)    Overall Financial Resource Strain (CARDIA)     Difficulty of Paying Living Expenses: Not very hard   Food Insecurity: No Food Insecurity (11/29/2024)    Hunger Vital Sign     Worried About Running Out of Food in the Last Year: Never true     Ran Out of Food in the Last Year: Never true   Transportation Needs: No Transportation Needs (11/20/2023)    Received from [facility]     Lack of Transportation (Medical): No     Lack of Transportation (Non-Medical): No   Physical Activity: Inactive (11/29/2024)    Exercise Vital Sign     Days of Exercise per Week: 0 days     Minutes of Exercise per Session: 0 min   Stress: No Stress Concern Present (11/29/2024)    [employer] [place] - Occupational Stress Questionnaire     Feeling of Stress : Only a little   Housing Stability: Unknown (11/29/2024)    Housing Stability Vital Sign     Unable to Pay for Housing in the Last Year: No   [2]   Outpatient Encounter Medications as of 6/27/2025   Medication Sig Dispense Refill    amLODIPine (NORVASC) 5 MG tablet Take 1 tablet (5 mg total) by mouth once daily. 90 tablet 3    buPROPion (WELLBUTRIN XL) 300 MG 24 hr tablet Take 1 tablet (300 mg total) by mouth once daily. 90 tablet 3    gabapentin (NEURONTIN) 100 MG capsule Take 1 capsule (100 mg total) by mouth 3 (three) times daily. 60 capsule 0    ketoconazole (NIZORAL) 2 % cream AAA bid 60 g 11    norethin-e.estradiol triphasic (ARANELLE, 28,) 0.5/1/0.5-35 mg-mcg Tab Take 1 tablet by mouth once daily. 84 each 3    pantoprazole (PROTONIX) 40 MG tablet Take 1 tablet (40 mg total) by mouth once daily. 30 tablet 3    amoxicillin (AMOXIL) 500 MG Tab Take 1 tablet (500 mg total) by mouth every 12 (twelve) hours. for 7 days 14 tablet 0    brompheniramine-pseudoeph-DM (BROMFED DM) 2-30-10 mg/5 mL Syrp Take 5 mLs by mouth 4 (four) times daily as needed. 118 mL 0    HYDROcodone-acetaminophen (NORCO) 5-325  mg per tablet Take 1 tablet by mouth every 6 (six) hours as needed for Pain. 12 tablet 0    [DISCONTINUED] HYDROcodone-acetaminophen (NORCO) 5-325 mg per tablet Take 1 tablet by mouth every 6 (six) hours as needed for Pain. (Patient not taking: Reported on 6/27/2025) 12 tablet 0    [DISCONTINUED] methocarbamoL (ROBAXIN) 500 MG Tab Take 500 mg by mouth. (Patient not taking: Reported on 6/27/2025)      [DISCONTINUED] methylPREDNISolone (MEDROL DOSEPACK) 4 mg tablet use as directed (Patient not taking: Reported on 6/27/2025) 21 each 0     No facility-administered encounter medications on file as of 6/27/2025.

## 2025-07-05 ENCOUNTER — PATIENT MESSAGE (OUTPATIENT)
Dept: PRIMARY CARE CLINIC | Facility: CLINIC | Age: 50
End: 2025-07-05
Payer: COMMERCIAL

## 2025-07-07 RX ORDER — AZITHROMYCIN 250 MG/1
TABLET, FILM COATED ORAL
Qty: 6 TABLET | Refills: 0 | Status: SHIPPED | OUTPATIENT
Start: 2025-07-07 | End: 2025-07-12

## 2025-07-07 NOTE — TELEPHONE ENCOUNTER
LOV with Wai Rodriguez MD , 6/27/2025    Patient states they finished they finished the Amoxicillin 7/4/25 and was doing better. Patient states she woke up on 7/5/25 coughing up yellow mucous. Inquiring about the need for more antibiotics.    Note dated 6/27/25:  Acute non-recurrent frontal sinusitis  -     amoxicillin (AMOXIL) 500 MG Tab; Take 1 tablet (500 mg total) by mouth every 12 (twelve) hours. for 7 days  Dispense: 14 tablet; Refill: 0  -     brompheniramine-pseudoeph-DM (BROMFED DM) 2-30-10 mg/5 mL Syrp; Take 5 mLs by mouth 4 (four) times daily as needed.  Dispense: 118 mL; Refill: 0

## 2025-07-17 ENCOUNTER — PATIENT MESSAGE (OUTPATIENT)
Dept: PRIMARY CARE CLINIC | Facility: CLINIC | Age: 50
End: 2025-07-17

## 2025-07-17 ENCOUNTER — E-VISIT (OUTPATIENT)
Dept: PRIMARY CARE CLINIC | Facility: CLINIC | Age: 50
End: 2025-07-17
Payer: COMMERCIAL

## 2025-07-17 DIAGNOSIS — J01.10 ACUTE NON-RECURRENT FRONTAL SINUSITIS: Primary | ICD-10-CM

## 2025-07-18 RX ORDER — BROMPHENIRAMINE MALEATE, PSEUDOEPHEDRINE HYDROCHLORIDE, AND DEXTROMETHORPHAN HYDROBROMIDE 2; 30; 10 MG/5ML; MG/5ML; MG/5ML
5 SYRUP ORAL 4 TIMES DAILY PRN
Qty: 118 ML | Refills: 0 | Status: SHIPPED | OUTPATIENT
Start: 2025-07-18 | End: 2025-07-28

## 2025-07-18 NOTE — PROGRESS NOTES
Patient ID: Matilde Redding is a 50 y.o. female.        E-Visit Time Tracking:             Chief Complaint: General Illness (Entered automatically based on patient selection in [employer].)      The patient initiated a request through [employer] on 7/17/2025 for evaluation and management with a chief complaint of General Illness (Entered automatically based on patient selection in [employer].)     I evaluated the questionnaire submission on 07/18/2025.    [facility] Evisit [facility]-Cough And Cold    7/17/2025  5:28 PM CDT - Filed by Patient   What do you need help with? Cough, Cold, Sore Throat   Do you agree to participate in an E-Visit? Yes   If you have any of the following symptoms, go to your local emergency room or call 911: I acknowledge   Do you have any of the following pregnancy-related conditions? (Pregnant, Possibly pregnant, Breast feeding, None) None   What is the main issue you would like addressed today? Constant cough with yellow mucus im having bouts of heavy coughing fits a few times a day   Do you think you might have COVID-19 or the Flu? (COVID-19, Flu, No, Not sure) No   What symptoms do you currently have?(Chills, Cough, Diarrhea, Fatigue, Headache, Stuffy nose, Loss of taste or smell, Muscle or body aches, Nausea, Runny nose, Sore throat, Face and nose pain, Ear pain, Neck pain, None) Cough;  Other   What symptoms do you currently have? Cough and hoarse voice   Describe your cough:(Contains mucus, Comes and goes, Dry, Does not stop, Interferes with daily activities ) Contains mucus;  Comes and goes;  Interferes with daily activities   Describe your mucus:(Bloody, Green, Yellow, Clear, White, Foamy, Thick, Thin, Foul smelling) Yellow;  Thick   Have you ever smoked?(Smoked in the past, Currently smoke, Never smoked) Never smoked   What has been the range of your fever?(Below 100.4, 100.4 or higher, Not sure) No   When did your concern begin? 6/20/2025   In the last two weeks, have you been in close  contact with someone who has COVID-19, the Flu, or strep throat? No   What have you tried to help your symptoms? Cough syrup;  Other   List what you have done or taken to help your symptoms Kendy from dr hurst at last visit   On a scale of 1-10, where 10 is the worst you can imagine, how severe are your symptoms? (range: 1 - 10) 8   Have your symptoms changed since they first started?(Improved, Worsened, No change) No change   Do you have transportation to an [facility] location to get tested for COVID-19? Yes   Provide any additional information you feel is important. Constant coughing spells very intense with yellow mucus   Please attach any relevant images or files    Are you able to take your vitals? No         No diagnosis found.     No orders of the defined types were placed in this encounter.           No follow-ups on file.

## 2025-07-22 DIAGNOSIS — R05.3 CHRONIC COUGH: ICD-10-CM

## 2025-07-22 RX ORDER — PANTOPRAZOLE SODIUM 40 MG/1
40 TABLET, DELAYED RELEASE ORAL
Qty: 90 TABLET | Refills: 3 | Status: SHIPPED | OUTPATIENT
Start: 2025-07-22

## 2025-07-22 NOTE — TELEPHONE ENCOUNTER
No care due was identified.  [facility] Embedded Care Due Messages. Reference number: [id number].   7/22/2025 11:56:31 AM CDT

## 2025-07-22 NOTE — TELEPHONE ENCOUNTER
Refill Decision Note   Matilde Redding  is requesting a refill authorization.  Brief Assessment and Rationale for Refill:  Approve     Medication Therapy Plan:         Comments:     Note composed:5:30 PM 07/22/2025

## 2025-08-28 ENCOUNTER — OFFICE VISIT (OUTPATIENT)
Dept: URGENT CARE | Facility: CLINIC | Age: 50
End: 2025-08-28
Payer: COMMERCIAL

## 2025-08-28 VITALS
RESPIRATION RATE: 20 BRPM | HEIGHT: 71 IN | WEIGHT: 260 LBS | SYSTOLIC BLOOD PRESSURE: 138 MMHG | OXYGEN SATURATION: 97 % | DIASTOLIC BLOOD PRESSURE: 93 MMHG | TEMPERATURE: 99 F | HEART RATE: 114 BPM | BODY MASS INDEX: 36.4 KG/M2

## 2025-08-28 DIAGNOSIS — U07.1 COVID: Primary | ICD-10-CM

## 2025-08-28 DIAGNOSIS — R52 BODY ACHES: ICD-10-CM

## 2025-08-28 DIAGNOSIS — U07.1 COVID-19 VIRUS DETECTED: ICD-10-CM

## 2025-08-28 LAB
CTP QC/QA: YES
CTP QC/QA: YES
FLUAV AG NPH QL: NEGATIVE
FLUBV AG NPH QL: NEGATIVE
SARS-COV+SARS-COV-2 AG RESP QL IA.RAPID: POSITIVE

## 2025-08-28 RX ORDER — NIRMATRELVIR AND RITONAVIR 300-100 MG
KIT ORAL
Qty: 30 TABLET | Refills: 0 | Status: SHIPPED | OUTPATIENT
Start: 2025-08-28 | End: 2025-09-02

## 2025-09-01 ENCOUNTER — OFFICE VISIT (OUTPATIENT)
Dept: URGENT CARE | Facility: CLINIC | Age: 50
End: 2025-09-01
Payer: COMMERCIAL

## 2025-09-01 VITALS
HEIGHT: 71 IN | DIASTOLIC BLOOD PRESSURE: 93 MMHG | RESPIRATION RATE: 17 BRPM | HEART RATE: 106 BPM | BODY MASS INDEX: 36.4 KG/M2 | TEMPERATURE: 98 F | WEIGHT: 260 LBS | OXYGEN SATURATION: 97 % | SYSTOLIC BLOOD PRESSURE: 146 MMHG

## 2025-09-01 DIAGNOSIS — U07.1 COVID-19: Primary | ICD-10-CM

## 2025-09-01 PROCEDURE — 99214 OFFICE O/P EST MOD 30 MIN: CPT | Mod: S$GLB,,,

## 2025-09-01 PROCEDURE — 71046 X-RAY EXAM CHEST 2 VIEWS: CPT | Mod: S$GLB,,, | Performed by: RADIOLOGY

## 2025-09-01 RX ORDER — BENZONATATE 100 MG/1
100 CAPSULE ORAL 3 TIMES DAILY PRN
Qty: 30 CAPSULE | Refills: 0 | Status: SHIPPED | OUTPATIENT
Start: 2025-09-01 | End: 2025-09-11

## 2025-09-03 ENCOUNTER — OFFICE VISIT (OUTPATIENT)
Dept: URGENT CARE | Facility: CLINIC | Age: 50
End: 2025-09-03
Payer: COMMERCIAL

## 2025-09-03 ENCOUNTER — PATIENT MESSAGE (OUTPATIENT)
Dept: PRIMARY CARE CLINIC | Facility: CLINIC | Age: 50
End: 2025-09-03
Payer: COMMERCIAL

## 2025-09-03 VITALS
BODY MASS INDEX: 36.4 KG/M2 | OXYGEN SATURATION: 96 % | RESPIRATION RATE: 18 BRPM | SYSTOLIC BLOOD PRESSURE: 149 MMHG | WEIGHT: 260 LBS | HEART RATE: 103 BPM | TEMPERATURE: 98 F | DIASTOLIC BLOOD PRESSURE: 95 MMHG | HEIGHT: 71 IN

## 2025-09-03 DIAGNOSIS — Z02.89 ENCOUNTER TO OBTAIN EXCUSE FROM WORK: ICD-10-CM

## 2025-09-03 DIAGNOSIS — Z86.16 HISTORY OF COVID-19: Primary | ICD-10-CM

## 2025-09-03 DIAGNOSIS — R05.9 COUGH, UNSPECIFIED TYPE: ICD-10-CM

## 2025-09-03 DIAGNOSIS — U07.1 COVID-19: ICD-10-CM

## 2025-09-03 PROCEDURE — 99214 OFFICE O/P EST MOD 30 MIN: CPT | Mod: S$GLB,,, | Performed by: NURSE PRACTITIONER

## 2025-09-03 PROCEDURE — 87811 SARS-COV-2 COVID19 W/OPTIC: CPT | Mod: QW,S$GLB,, | Performed by: NURSE PRACTITIONER

## 2025-09-03 PROCEDURE — 87804 INFLUENZA ASSAY W/OPTIC: CPT | Mod: QW,,, | Performed by: NURSE PRACTITIONER

## 2025-09-03 RX ORDER — GUAIFENESIN AND DEXTROMETHORPHAN HYDROBROMIDE 10; 100 MG/5ML; MG/5ML
5 SYRUP ORAL EVERY 6 HOURS PRN
Qty: 200 ML | Refills: 0 | Status: SHIPPED | OUTPATIENT
Start: 2025-09-03 | End: 2025-09-13